# Patient Record
Sex: MALE | Race: WHITE | NOT HISPANIC OR LATINO | Employment: OTHER | URBAN - METROPOLITAN AREA
[De-identification: names, ages, dates, MRNs, and addresses within clinical notes are randomized per-mention and may not be internally consistent; named-entity substitution may affect disease eponyms.]

---

## 2023-06-07 DIAGNOSIS — K21.9 GASTROESOPHAGEAL REFLUX DISEASE, UNSPECIFIED WHETHER ESOPHAGITIS PRESENT: ICD-10-CM

## 2023-06-07 DIAGNOSIS — E78.2 MIXED HYPERLIPIDEMIA: ICD-10-CM

## 2023-06-08 PROBLEM — E78.5 HYPERLIPIDEMIA: Status: ACTIVE | Noted: 2023-06-08

## 2023-06-08 PROBLEM — K21.9 GERD (GASTROESOPHAGEAL REFLUX DISEASE): Status: ACTIVE | Noted: 2023-06-08

## 2023-06-08 PROBLEM — R01.1 CARDIAC MURMUR: Status: ACTIVE | Noted: 2023-06-08

## 2023-06-08 PROBLEM — N52.9 ERECTILE DYSFUNCTION: Status: ACTIVE | Noted: 2023-06-08

## 2023-06-08 RX ORDER — PRAVASTATIN SODIUM 40 MG/1
40 TABLET ORAL DAILY
Qty: 90 TABLET | Refills: 1 | Status: SHIPPED | OUTPATIENT
Start: 2023-06-08 | End: 2023-06-23 | Stop reason: SDUPTHER

## 2023-06-08 RX ORDER — OMEPRAZOLE 40 MG/1
40 CAPSULE, DELAYED RELEASE ORAL DAILY
COMMUNITY
Start: 2023-01-10 | End: 2023-06-08 | Stop reason: SDUPTHER

## 2023-06-08 RX ORDER — OMEPRAZOLE 40 MG/1
40 CAPSULE, DELAYED RELEASE ORAL DAILY
Qty: 90 CAPSULE | Refills: 1 | Status: SHIPPED | OUTPATIENT
Start: 2023-06-08 | End: 2023-06-23 | Stop reason: SDUPTHER

## 2023-06-08 RX ORDER — PRAVASTATIN SODIUM 40 MG/1
40 TABLET ORAL DAILY
COMMUNITY
End: 2023-06-08 | Stop reason: SDUPTHER

## 2023-06-23 DIAGNOSIS — E78.2 MIXED HYPERLIPIDEMIA: ICD-10-CM

## 2023-06-23 DIAGNOSIS — K21.9 GASTROESOPHAGEAL REFLUX DISEASE, UNSPECIFIED WHETHER ESOPHAGITIS PRESENT: ICD-10-CM

## 2023-06-24 RX ORDER — PRAVASTATIN SODIUM 40 MG/1
40 TABLET ORAL DAILY
Qty: 90 TABLET | Refills: 1 | Status: SHIPPED | OUTPATIENT
Start: 2023-06-24 | End: 2023-07-12 | Stop reason: SDUPTHER

## 2023-06-24 RX ORDER — OMEPRAZOLE 40 MG/1
40 CAPSULE, DELAYED RELEASE ORAL DAILY
Qty: 90 CAPSULE | Refills: 1 | Status: SHIPPED | OUTPATIENT
Start: 2023-06-24 | End: 2023-07-12 | Stop reason: SDUPTHER

## 2023-07-10 ENCOUNTER — TELEMEDICINE (OUTPATIENT)
Dept: PRIMARY CARE | Facility: CLINIC | Age: 68
End: 2023-07-10
Payer: MEDICARE

## 2023-07-10 DIAGNOSIS — E34.9 TESTOSTERONE DEFICIENCY: ICD-10-CM

## 2023-07-10 DIAGNOSIS — R01.1 CARDIAC MURMUR: ICD-10-CM

## 2023-07-10 DIAGNOSIS — I25.10 ASHD (ARTERIOSCLEROTIC HEART DISEASE): Primary | ICD-10-CM

## 2023-07-10 DIAGNOSIS — K21.00 GASTROESOPHAGEAL REFLUX DISEASE WITH ESOPHAGITIS WITHOUT HEMORRHAGE: ICD-10-CM

## 2023-07-10 DIAGNOSIS — I51.7 LVH (LEFT VENTRICULAR HYPERTROPHY): ICD-10-CM

## 2023-07-10 PROCEDURE — 99213 OFFICE O/P EST LOW 20 MIN: CPT | Performed by: FAMILY MEDICINE

## 2023-07-10 RX ORDER — SILDENAFIL 100 MG/1
TABLET, FILM COATED ORAL
COMMUNITY

## 2023-07-10 ASSESSMENT — ENCOUNTER SYMPTOMS
SLEEP DISTURBANCE: 0
CHEST TIGHTNESS: 0
PHOTOPHOBIA: 0
DIARRHEA: 0
CONFUSION: 0
MYALGIAS: 0
ABDOMINAL PAIN: 0
RHINORRHEA: 0
SINUS PAIN: 0
ADENOPATHY: 0
STRIDOR: 0
APPETITE CHANGE: 0
COLOR CHANGE: 0
LOSS OF SENSATION IN FEET: 0
BLOOD IN STOOL: 0
HEADACHES: 0
FLANK PAIN: 0
FEVER: 0
CONSTITUTIONAL NEGATIVE: 1
HEMATURIA: 0
DYSPHORIC MOOD: 0
PALPITATIONS: 0
SHORTNESS OF BREATH: 0
POLYDIPSIA: 0
NERVOUS/ANXIOUS: 0
DIZZINESS: 0
SPEECH DIFFICULTY: 0
ABDOMINAL DISTENTION: 0
SORE THROAT: 0
EYE PAIN: 0
ARTHRALGIAS: 0
OCCASIONAL FEELINGS OF UNSTEADINESS: 0
DYSURIA: 0
AGITATION: 0
SINUS PRESSURE: 0
TROUBLE SWALLOWING: 0
FATIGUE: 0
RECTAL PAIN: 0
POLYPHAGIA: 0
SEIZURES: 0
ACTIVITY CHANGE: 0
NECK STIFFNESS: 0
DECREASED CONCENTRATION: 0
CONSTIPATION: 0
COUGH: 0
DEPRESSION: 0

## 2023-07-10 NOTE — PROGRESS NOTES
Subjective   Patient ID: Tom Pyle is a 67 y.o. male who presents for Erectile Dysfunction and Heart Problem.    PHONECALL    Patient presents today to discuss getting some labs and a possible stress test completed. Patient states everything is noted in his message from 06/19/2023.   Patient denies any chest pain, SOB, dizziness, edema or headaches          Review of Systems   Constitutional: Negative.  Negative for activity change, appetite change, fatigue and fever.   HENT:  Negative for congestion, dental problem, ear discharge, ear pain, mouth sores, rhinorrhea, sinus pressure, sinus pain, sore throat, tinnitus and trouble swallowing.    Eyes:  Negative for photophobia, pain and visual disturbance.   Respiratory:  Negative for cough, chest tightness, shortness of breath and stridor.    Cardiovascular:  Negative for chest pain and palpitations.   Gastrointestinal:  Negative for abdominal distention, abdominal pain, blood in stool, constipation, diarrhea and rectal pain.   Endocrine: Negative for cold intolerance, heat intolerance, polydipsia, polyphagia and polyuria.   Genitourinary:  Negative for dysuria, flank pain, hematuria and urgency.   Musculoskeletal:  Negative for arthralgias, gait problem, myalgias and neck stiffness.   Skin:  Negative for color change and rash.   Allergic/Immunologic: Negative for environmental allergies and food allergies.   Neurological:  Negative for dizziness, seizures, syncope, speech difficulty and headaches.   Hematological:  Negative for adenopathy.   Psychiatric/Behavioral:  Negative for agitation, confusion, decreased concentration, dysphoric mood and sleep disturbance. The patient is not nervous/anxious.        Objective   There were no vitals taken for this visit.    Physical Exam  Constitutional: Well developed, well nourished, alert and in no acute distress   Psychiatric: Mood calm and affect normal    Telephone Visit - Audio Communication Only      Assessment/Plan    Problem List Items Addressed This Visit       GERD (gastroesophageal reflux disease)    Cardiac murmur    Relevant Orders    Echocardiogram stress test     Other Visit Diagnoses       ASHD (arteriosclerotic heart disease)    -  Primary    Relevant Medications    sildenafil (Viagra) 100 mg tablet    LVH (left ventricular hypertrophy)        Relevant Medications    sildenafil (Viagra) 100 mg tablet    Other Relevant Orders    Echocardiogram stress test    Testosterone deficiency        Relevant Orders    Testosterone         Scribe Attestation  By signing my name below, I, Joselin MINOR , Scribe   attest that this documentation has been prepared under the direction and in the presence of Chuck Leon DO.  Provider Attestation - Scribe documentation  All medical record entries made by the Scribe were at my direction and personally dictated by me. I have reviewed the chart and agree that the record accurately reflects my personal performance of the history, physical exam, discussion and plan.

## 2023-07-10 NOTE — PATIENT INSTRUCTIONS
Follow up in 3.5 months    Continue current medications and therapy for chronic medical conditions.    Patient was advised importance of proper diet/nutrition in addition adequate hydration. Patient was encouraged moderate exercise program to include 30 minutes daily for 5 days of the week or 150 minutes weekly. Patient will follow-up with us as scheduled.    Testosterone level ordered     Echo /stress test ordered

## 2023-07-12 DIAGNOSIS — E78.2 MIXED HYPERLIPIDEMIA: ICD-10-CM

## 2023-07-12 DIAGNOSIS — K21.9 GASTROESOPHAGEAL REFLUX DISEASE, UNSPECIFIED WHETHER ESOPHAGITIS PRESENT: ICD-10-CM

## 2023-07-12 RX ORDER — OMEPRAZOLE 40 MG/1
40 CAPSULE, DELAYED RELEASE ORAL DAILY
Qty: 90 CAPSULE | Refills: 1 | Status: SHIPPED | OUTPATIENT
Start: 2023-07-12 | End: 2024-06-07 | Stop reason: SDUPTHER

## 2023-07-12 RX ORDER — PRAVASTATIN SODIUM 40 MG/1
40 TABLET ORAL DAILY
Qty: 90 TABLET | Refills: 1 | Status: SHIPPED | OUTPATIENT
Start: 2023-07-12 | End: 2024-05-02 | Stop reason: WASHOUT

## 2023-08-01 ENCOUNTER — LAB (OUTPATIENT)
Dept: LAB | Facility: LAB | Age: 68
End: 2023-08-01
Payer: MEDICARE

## 2023-08-01 DIAGNOSIS — E34.9 TESTOSTERONE DEFICIENCY: ICD-10-CM

## 2023-08-01 DIAGNOSIS — E78.2 MIXED HYPERLIPIDEMIA: ICD-10-CM

## 2023-08-01 LAB
ALANINE AMINOTRANSFERASE (SGPT) (U/L) IN SER/PLAS: 16 U/L (ref 10–52)
ALBUMIN (G/DL) IN SER/PLAS: 4.6 G/DL (ref 3.4–5)
ALKALINE PHOSPHATASE (U/L) IN SER/PLAS: 73 U/L (ref 33–136)
ANION GAP IN SER/PLAS: 11 MMOL/L (ref 10–20)
ASPARTATE AMINOTRANSFERASE (SGOT) (U/L) IN SER/PLAS: 18 U/L (ref 9–39)
BASOPHILS (10*3/UL) IN BLOOD BY AUTOMATED COUNT: 0.02 X10E9/L (ref 0–0.1)
BASOPHILS/100 LEUKOCYTES IN BLOOD BY AUTOMATED COUNT: 0.4 % (ref 0–2)
BILIRUBIN TOTAL (MG/DL) IN SER/PLAS: 0.8 MG/DL (ref 0–1.2)
CALCIDIOL (25 OH VITAMIN D3) (NG/ML) IN SER/PLAS: 44 NG/ML
CALCIUM (MG/DL) IN SER/PLAS: 9.9 MG/DL (ref 8.6–10.3)
CARBON DIOXIDE, TOTAL (MMOL/L) IN SER/PLAS: 28 MMOL/L (ref 21–32)
CHLORIDE (MMOL/L) IN SER/PLAS: 106 MMOL/L (ref 98–107)
CHOLESTEROL (MG/DL) IN SER/PLAS: 225 MG/DL (ref 0–199)
CHOLESTEROL IN HDL (MG/DL) IN SER/PLAS: 59.3 MG/DL
CHOLESTEROL/HDL RATIO: 3.8
CREATININE (MG/DL) IN SER/PLAS: 0.95 MG/DL (ref 0.5–1.3)
EOSINOPHILS (10*3/UL) IN BLOOD BY AUTOMATED COUNT: 0.1 X10E9/L (ref 0–0.7)
EOSINOPHILS/100 LEUKOCYTES IN BLOOD BY AUTOMATED COUNT: 1.8 % (ref 0–6)
ERYTHROCYTE DISTRIBUTION WIDTH (RATIO) BY AUTOMATED COUNT: 12 % (ref 11.5–14.5)
ERYTHROCYTE MEAN CORPUSCULAR HEMOGLOBIN CONCENTRATION (G/DL) BY AUTOMATED: 32.3 G/DL (ref 32–36)
ERYTHROCYTE MEAN CORPUSCULAR VOLUME (FL) BY AUTOMATED COUNT: 96 FL (ref 80–100)
ERYTHROCYTES (10*6/UL) IN BLOOD BY AUTOMATED COUNT: 4.8 X10E12/L (ref 4.5–5.9)
GFR MALE: 88 ML/MIN/1.73M2
GLUCOSE (MG/DL) IN SER/PLAS: 95 MG/DL (ref 74–99)
HEMATOCRIT (%) IN BLOOD BY AUTOMATED COUNT: 46.2 % (ref 41–52)
HEMOGLOBIN (G/DL) IN BLOOD: 14.9 G/DL (ref 13.5–17.5)
IMMATURE GRANULOCYTES/100 LEUKOCYTES IN BLOOD BY AUTOMATED COUNT: 0.2 % (ref 0–0.9)
LDL: 148 MG/DL (ref 0–99)
LEUKOCYTES (10*3/UL) IN BLOOD BY AUTOMATED COUNT: 5.4 X10E9/L (ref 4.4–11.3)
LYMPHOCYTES (10*3/UL) IN BLOOD BY AUTOMATED COUNT: 1.62 X10E9/L (ref 1.2–4.8)
LYMPHOCYTES/100 LEUKOCYTES IN BLOOD BY AUTOMATED COUNT: 29.8 % (ref 13–44)
MONOCYTES (10*3/UL) IN BLOOD BY AUTOMATED COUNT: 0.45 X10E9/L (ref 0.1–1)
MONOCYTES/100 LEUKOCYTES IN BLOOD BY AUTOMATED COUNT: 8.3 % (ref 2–10)
NEUTROPHILS (10*3/UL) IN BLOOD BY AUTOMATED COUNT: 3.24 X10E9/L (ref 1.2–7.7)
NEUTROPHILS/100 LEUKOCYTES IN BLOOD BY AUTOMATED COUNT: 59.5 % (ref 40–80)
PLATELETS (10*3/UL) IN BLOOD AUTOMATED COUNT: 292 X10E9/L (ref 150–450)
POTASSIUM (MMOL/L) IN SER/PLAS: 4.1 MMOL/L (ref 3.5–5.3)
PROTEIN TOTAL: 7.8 G/DL (ref 6.4–8.2)
SODIUM (MMOL/L) IN SER/PLAS: 141 MMOL/L (ref 136–145)
TESTOSTERONE (NG/DL) IN SER/PLAS: 580 NG/DL (ref 240–1000)
THYROTROPIN (MIU/L) IN SER/PLAS BY DETECTION LIMIT <= 0.05 MIU/L: 0.77 MIU/L (ref 0.44–3.98)
TRIGLYCERIDE (MG/DL) IN SER/PLAS: 88 MG/DL (ref 0–149)
UREA NITROGEN (MG/DL) IN SER/PLAS: 18 MG/DL (ref 6–23)
VLDL: 18 MG/DL (ref 0–40)

## 2023-08-01 PROCEDURE — 36415 COLL VENOUS BLD VENIPUNCTURE: CPT

## 2023-08-01 PROCEDURE — 84403 ASSAY OF TOTAL TESTOSTERONE: CPT

## 2023-08-01 NOTE — LETTER
September 9, 2023     Tom Pyle  [address] [address]      Dear Mr. Pyle:    Below are the results from your recent visit:    LAB RESULTS SHOW ELEVATED CHOLESTEROL LEVELS. RECOMMEND LOW CHOLESTEROL DIET TO AVOID RED MEATS, CHEESES AND FRIEND FOODS. MAY NEED TO CONSIDER STARTING A STATIN MEDICATION.     Resulted Orders   Lipid Panel   Result Value Ref Range    Cholesterol 225 (H) 0 - 199 mg/dL      Comment:      .      AGE      DESIRABLE   BORDERLINE HIGH   HIGH     0-19 Y     0 - 169       170 - 199     >/= 200    20-24 Y     0 - 189       190 - 224     >/= 225         >24 Y     0 - 199       200 - 239     >/= 240   **All ranges are based on fasting samples. Specific   therapeutic targets will vary based on patient-specific   cardiac risk.  .   Pediatric guidelines reference:Pediatrics 2011, 128(S5).   Adult guidelines reference: NCEP ATPIII Guidelines,     ANDREW 2001, 258:2486-97  .   Venipuncture immediately after or during the    administration of Metamizole may lead to falsely   low results. Testing should be performed immediately   prior to Metamizole dosing.      HDL 59.3 mg/dL      Comment:      .      AGE      VERY LOW   LOW     NORMAL    HIGH       0-19 Y       < 35   < 40     40-45     ----    20-24 Y       ----   < 40       >45     ----      >24 Y       ----   < 40     40-60      >60  .      Cholesterol/HDL Ratio 3.8       Comment:      REF VALUES  DESIRABLE  < 3.4  HIGH RISK  > 5.0       (H) 0 - 99 mg/dL      Comment:      .                           NEAR      BORD      AGE      DESIRABLE  OPTIMAL    HIGH     HIGH     VERY HIGH     0-19 Y     0 - 109     ---    110-129   >/= 130     ----    20-24 Y     0 - 119     ---    120-159   >/= 160     ----      >24 Y     0 -  99   100-129  130-159   160-189     >/=190  .      VLDL 18 0 - 40 mg/dL    Triglycerides 88 0 - 149 mg/dL      Comment:      .      AGE      DESIRABLE   BORDERLINE HIGH   HIGH     VERY HIGH   0 D-90 D    19 -  174         ----         ----        ----  91 D- 9 Y     0 -  74        75 -  99     >/= 100      ----    10-19 Y     0 -  89        90 - 129     >/= 130      ----    20-24 Y     0 - 114       115 - 149     >/= 150      ----         >24 Y     0 - 149       150 - 199    200- 499    >/= 500  .   Venipuncture immediately after or during the    administration of Metamizole may lead to falsely   low results. Testing should be performed immediately   prior to Metamizole dosing.     CBC and Auto Differential   Result Value Ref Range    WBC 5.4 4.4 - 11.3 x10E9/L    RBC 4.80 4.50 - 5.90 x10E12/L    Hemoglobin 14.9 13.5 - 17.5 g/dL    Hematocrit 46.2 41.0 - 52.0 %    MCV 96 80 - 100 fL    MCHC 32.3 32.0 - 36.0 g/dL    Platelets 292 150 - 450 x10E9/L    RDW 12.0 11.5 - 14.5 %    Neutrophils % 59.5 40.0 - 80.0 %    Immature Granulocytes %, Automated 0.2 0.0 - 0.9 %      Comment:       Immature Granulocyte Count (IG) includes promyelocytes,    myelocytes and metamyelocytes but does not include bands.   Percent differential counts (%) should be interpreted in the   context of the absolute cell counts (cells/L).      Lymphocytes % 29.8 13.0 - 44.0 %    Monocytes % 8.3 2.0 - 10.0 %    Eosinophils % 1.8 0.0 - 6.0 %    Basophils % 0.4 0.0 - 2.0 %    Neutrophils Absolute 3.24 1.20 - 7.70 x10E9/L    Lymphocytes Absolute 1.62 1.20 - 4.80 x10E9/L    Monocytes Absolute 0.45 0.10 - 1.00 x10E9/L    Eosinophils Absolute 0.10 0.00 - 0.70 x10E9/L    Basophils Absolute 0.02 0.00 - 0.10 x10E9/L   Thyroid Stimulating Hormone   Result Value Ref Range    TSH 0.77 0.44 - 3.98 mIU/L      Comment:       TSH testing is performed using different testing    methodology at [facility] than at other    [facility]. Direct result comparisons should    only be made within the same method.     Comprehensive Metabolic Panel   Result Value Ref Range    Glucose 95 74 - 99 mg/dL    Sodium 141 136 - 145 mmol/L    Potassium 4.1 3.5 - 5.3 mmol/L     Chloride 106 98 - 107 mmol/L    Bicarbonate 28 21 - 32 mmol/L    Anion Gap 11 10 - 20 mmol/L    Urea Nitrogen 18 6 - 23 mg/dL    Creatinine 0.95 0.50 - 1.30 mg/dL    GFR MALE 88 >90 mL/min/1.73m2      Comment:       CALCULATIONS OF ESTIMATED GFR ARE PERFORMED   USING THE 2021 CKD-EPI STUDY REFIT EQUATION   WITHOUT THE RACE VARIABLE FOR THE IDMS-TRACEABLE   CREATININE METHODS.    https://jasn.asnjournals.org/content/early/2021/09/22/ASN.1445941646      Calcium 9.9 8.6 - 10.3 mg/dL    Albumin 4.6 3.4 - 5.0 g/dL    Alkaline Phosphatase 73 33 - 136 U/L    Total Protein 7.8 6.4 - 8.2 g/dL    AST 18 9 - 39 U/L    Total Bilirubin 0.8 0.0 - 1.2 mg/dL    ALT (SGPT) 16 10 - 52 U/L      Comment:       Patients treated with Sulfasalazine may generate    falsely decreased results for ALT.     Vitamin D, Total   Result Value Ref Range    Vitamin D, 25-Hydroxy 44 ng/mL      Comment:      .  DEFICIENCY:         < 20   NG/ML  INSUFFICIENCY:      20-29  NG/ML  SUFFICIENCY:         NG/ML    THIS ASSAY ACCURATELY QUANTIFIES THE SUM OF  VITAMIN D3, 25-HYDROXY AND VIT D2,25-HYDROXY.       The test results show that your current treatment is working. Please continue your current medication and plan. We recommend that you repeat the above test(s) in 3 months.    If you have any questions or concerns, please don't hesitate to call.         Sincerely,        Chuck Leon, DO

## 2024-02-22 ENCOUNTER — OFFICE VISIT (OUTPATIENT)
Dept: PRIMARY CARE | Facility: CLINIC | Age: 69
End: 2024-02-22
Payer: MEDICARE

## 2024-02-22 ENCOUNTER — HOSPITAL ENCOUNTER (OUTPATIENT)
Dept: RADIOLOGY | Facility: CLINIC | Age: 69
Discharge: HOME | End: 2024-02-22
Payer: MEDICARE

## 2024-02-22 VITALS
HEIGHT: 70 IN | WEIGHT: 193 LBS | BODY MASS INDEX: 27.63 KG/M2 | TEMPERATURE: 98.4 F | HEART RATE: 76 BPM | DIASTOLIC BLOOD PRESSURE: 68 MMHG | RESPIRATION RATE: 16 BRPM | SYSTOLIC BLOOD PRESSURE: 122 MMHG | OXYGEN SATURATION: 96 %

## 2024-02-22 DIAGNOSIS — J45.30 MILD PERSISTENT REACTIVE AIRWAY DISEASE WITHOUT COMPLICATION (HHS-HCC): ICD-10-CM

## 2024-02-22 DIAGNOSIS — K21.00 GASTROESOPHAGEAL REFLUX DISEASE WITH ESOPHAGITIS WITHOUT HEMORRHAGE: ICD-10-CM

## 2024-02-22 DIAGNOSIS — E78.2 MIXED HYPERLIPIDEMIA: ICD-10-CM

## 2024-02-22 DIAGNOSIS — R06.09 DYSPNEA ON EXERTION: ICD-10-CM

## 2024-02-22 DIAGNOSIS — Z12.5 PROSTATE CANCER SCREENING: ICD-10-CM

## 2024-02-22 DIAGNOSIS — N52.9 ERECTILE DYSFUNCTION, UNSPECIFIED ERECTILE DYSFUNCTION TYPE: ICD-10-CM

## 2024-02-22 DIAGNOSIS — Z00.00 MEDICARE ANNUAL WELLNESS VISIT, SUBSEQUENT: Primary | ICD-10-CM

## 2024-02-22 PROCEDURE — 99497 ADVNCD CARE PLAN 30 MIN: CPT | Performed by: FAMILY MEDICINE

## 2024-02-22 PROCEDURE — G0439 PPPS, SUBSEQ VISIT: HCPCS | Performed by: FAMILY MEDICINE

## 2024-02-22 PROCEDURE — 71046 X-RAY EXAM CHEST 2 VIEWS: CPT

## 2024-02-22 PROCEDURE — 99212 OFFICE O/P EST SF 10 MIN: CPT | Performed by: FAMILY MEDICINE

## 2024-02-22 PROCEDURE — 71046 X-RAY EXAM CHEST 2 VIEWS: CPT | Performed by: RADIOLOGY

## 2024-02-22 RX ORDER — ATORVASTATIN CALCIUM 20 MG/1
20 TABLET, FILM COATED ORAL NIGHTLY
Qty: 90 TABLET | Refills: 0 | Status: SHIPPED | OUTPATIENT
Start: 2024-02-22 | End: 2024-05-20

## 2024-02-22 RX ORDER — DOXYCYCLINE HYCLATE 20 MG
20 TABLET ORAL 2 TIMES DAILY
COMMUNITY
Start: 2023-12-08 | End: 2024-06-11 | Stop reason: ALTCHOICE

## 2024-02-22 RX ORDER — ALBUTEROL SULFATE 90 UG/1
2 AEROSOL, METERED RESPIRATORY (INHALATION) EVERY 6 HOURS PRN
Qty: 8 G | Refills: 1 | Status: SHIPPED | OUTPATIENT
Start: 2024-02-22 | End: 2025-02-21

## 2024-02-22 ASSESSMENT — ENCOUNTER SYMPTOMS
SEIZURES: 0
CONSTITUTIONAL NEGATIVE: 1
RECTAL PAIN: 0
DECREASED CONCENTRATION: 0
APPETITE CHANGE: 0
HEADACHES: 0
COUGH: 0
FEVER: 0
SHORTNESS OF BREATH: 0
SINUS PAIN: 0
SORE THROAT: 0
PHOTOPHOBIA: 0
TROUBLE SWALLOWING: 0
SPEECH DIFFICULTY: 0
ABDOMINAL DISTENTION: 0
DEPRESSION: 0
STRIDOR: 0
DYSPHORIC MOOD: 0
HEMATURIA: 0
COLOR CHANGE: 0
MYALGIAS: 0
NERVOUS/ANXIOUS: 0
ADENOPATHY: 0
CHEST TIGHTNESS: 0
BLOOD IN STOOL: 0
CONFUSION: 0
DIZZINESS: 0
AGITATION: 0
ABDOMINAL PAIN: 0
RHINORRHEA: 0
FATIGUE: 0
CONSTIPATION: 0
NECK STIFFNESS: 0
DYSURIA: 0
SLEEP DISTURBANCE: 0
POLYDIPSIA: 0
ACTIVITY CHANGE: 0
DIARRHEA: 0
OCCASIONAL FEELINGS OF UNSTEADINESS: 0
SINUS PRESSURE: 0
LOSS OF SENSATION IN FEET: 0
EYE PAIN: 0
PALPITATIONS: 0
ARTHRALGIAS: 0
POLYPHAGIA: 0
FLANK PAIN: 0

## 2024-02-22 ASSESSMENT — ACTIVITIES OF DAILY LIVING (ADL)
DOING_HOUSEWORK: INDEPENDENT
TAKING_MEDICATION: INDEPENDENT
MANAGING_FINANCES: INDEPENDENT
DRESSING: INDEPENDENT
GROCERY_SHOPPING: INDEPENDENT
BATHING: INDEPENDENT

## 2024-02-22 ASSESSMENT — PATIENT HEALTH QUESTIONNAIRE - PHQ9
SUM OF ALL RESPONSES TO PHQ9 QUESTIONS 1 AND 2: 0
2. FEELING DOWN, DEPRESSED OR HOPELESS: NOT AT ALL
1. LITTLE INTEREST OR PLEASURE IN DOING THINGS: NOT AT ALL

## 2024-02-22 NOTE — PATIENT INSTRUCTIONS
Follow up in 10 weeks    Continue current medications and therapy for chronic medical conditions.    Patient was advised importance of proper diet/nutrition in addition adequate hydration. Patient was encouraged moderate exercise program to include 30 minutes daily for 5 days of the week or 150 minutes weekly. Patient will follow-up with us as scheduled.    Complete Medicare annual wellness physical/exam     Discontinue pravastatin     Start atorvastatin 20 mg at bedtime    Obtain fasting lipid, CMP, and PSA labs     Start albuterol inhaler 2 puffs every 6 hours as needed    CXR PA/lateral today    PFTs in office    Counseled on advanced directives

## 2024-02-22 NOTE — PROGRESS NOTES
Subjective   Reason for Visit: Kevin Pyle is an 68 y.o. male here for a Medicare Wellness visit.     Past Medical, Surgical, and Family History reviewed and updated in chart.    Reviewed all medications by prescribing practitioner or clinical pharmacist (such as prescriptions, OTCs, herbal therapies and supplements) and documented in the medical record.    Patient is here for medicare annual wellness visit.    Patient states having some shortness of breath during the summer. Patient states just feels it when he is swimming. Patient denies having any other symptoms when the episode of shortness of breath occur. Patient denies have shortness of breath today.    Patient would like a referral for genetic counseling.     Patient denies any other symptoms or concerns today.        Patient Care Team:  Chuck Leon DO as PCP - General  Chuck Leon DO as PCP - [employer] PCP     Review of Systems   Constitutional: Negative.  Negative for activity change, appetite change, fatigue and fever.   HENT:  Negative for congestion, dental problem, ear discharge, ear pain, mouth sores, rhinorrhea, sinus pressure, sinus pain, sore throat, tinnitus and trouble swallowing.    Eyes:  Negative for photophobia, pain and visual disturbance.   Respiratory:  Negative for cough, chest tightness, shortness of breath and stridor.    Cardiovascular:  Negative for chest pain and palpitations.   Gastrointestinal:  Negative for abdominal distention, abdominal pain, blood in stool, constipation, diarrhea and rectal pain.   Endocrine: Negative for cold intolerance, heat intolerance, polydipsia, polyphagia and polyuria.   Genitourinary:  Negative for dysuria, flank pain, hematuria and urgency.   Musculoskeletal:  Negative for arthralgias, gait problem, myalgias and neck stiffness.   Skin:  Negative for color change and rash.   Allergic/Immunologic: Negative for environmental allergies and food allergies.  "  Neurological:  Negative for dizziness, seizures, syncope, speech difficulty and headaches.   Hematological:  Negative for adenopathy.   Psychiatric/Behavioral:  Negative for agitation, confusion, decreased concentration, dysphoric mood and sleep disturbance. The patient is not nervous/anxious.        Objective   Vitals:  /68 (BP Location: Right arm, Patient Position: Sitting, BP Cuff Size: Adult)   Pulse 76   Temp 36.9 °C (98.4 °F)   Resp 16   Ht 1.778 m (5' 10\")   Wt 87.5 kg (193 lb)   SpO2 96%   BMI 27.69 kg/m²       Physical Exam  Vitals reviewed.   Constitutional:       General: He is not in acute distress.     Appearance: Normal appearance. He is normal weight. He is not ill-appearing or diaphoretic.   HENT:      Head: Normocephalic.      Right Ear: Tympanic membrane and external ear normal.      Left Ear: Tympanic membrane and external ear normal.      Nose: Nose normal. No congestion.      Mouth/Throat:      Pharynx: No posterior oropharyngeal erythema.   Eyes:      General:         Right eye: No discharge.         Left eye: No discharge.      Extraocular Movements: Extraocular movements intact.      Conjunctiva/sclera: Conjunctivae normal.      Pupils: Pupils are equal, round, and reactive to light.   Cardiovascular:      Rate and Rhythm: Normal rate and regular rhythm.      Pulses: Normal pulses.      Heart sounds: Normal heart sounds. No murmur heard.  Pulmonary:      Effort: Pulmonary effort is normal. No respiratory distress.      Breath sounds: Normal breath sounds. No wheezing or rales.   Chest:      Chest wall: No tenderness.   Abdominal:      General: Abdomen is flat. Bowel sounds are normal. There is no distension.      Palpations: There is no mass.      Tenderness: There is no abdominal tenderness. There is no guarding.   Musculoskeletal:         General: No tenderness. Normal range of motion.      Cervical back: Normal range of motion and neck supple. No tenderness.      Right lower " leg: No edema.      Left lower leg: No edema.   Skin:     General: Skin is dry.      Coloration: Skin is not jaundiced.      Findings: No bruising, erythema or rash.   Neurological:      General: No focal deficit present.      Mental Status: He is alert and oriented to person, place, and time. Mental status is at baseline.      Cranial Nerves: No cranial nerve deficit.      Sensory: No sensory deficit.      Coordination: Coordination normal.      Gait: Gait normal.   Psychiatric:         Mood and Affect: Mood normal.         Thought Content: Thought content normal.         Judgment: Judgment normal.         Assessment/Plan   Problem List Items Addressed This Visit       Hyperlipidemia    Relevant Medications    atorvastatin (Lipitor) 20 mg tablet    GERD (gastroesophageal reflux disease)    Erectile dysfunction     Other Visit Diagnoses       Medicare annual wellness visit, subsequent    -  Primary    Prostate cancer screening        Relevant Orders    Prostate Spec.Ag,Screen    Dyspnea on exertion        Relevant Medications    albuterol (Ventolin HFA) 90 mcg/actuation inhaler    Other Relevant Orders    XR chest 2 views (Completed)    Mild persistent reactive airway disease without complication              Scribe Attestation  By signing my name below, I, Chuck Leon DO , Scribe   attest that this documentation has been prepared under the direction and in the presence of Chuck Leon DO.    Provider Attestation - Scribe documentation    All medical record entries made by the Scribe were at my direction and personally dictated by me. I have reviewed the chart and agree that the record accurately reflects my personal performance of the history, physical exam, discussion and plan.

## 2024-02-26 ENCOUNTER — PATIENT MESSAGE (OUTPATIENT)
Dept: PRIMARY CARE | Facility: CLINIC | Age: 69
End: 2024-02-26
Payer: MEDICARE

## 2024-02-26 DIAGNOSIS — Z83.2 FAMILY HISTORY OF BLOOD DISORDER: ICD-10-CM

## 2024-02-26 NOTE — TELEPHONE ENCOUNTER
From: Kevin Pyle  To: Chuck Leon DO  Sent: 2/26/2024 1:16 PM EST  Subject: MDS    I have contacted Genetics Counseling Dept and they require a PCP referral. Both my brothers have been dx with it.

## 2024-04-29 ASSESSMENT — ENCOUNTER SYMPTOMS
SORE THROAT: 0
CLAUDICATION: 0
SWOLLEN GLANDS: 0
FEVER: 0
ORTHOPNEA: 0
LEG PAIN: 0
WHEEZING: 1
VOMITING: 0
SYNCOPE: 0
HEMOPTYSIS: 0
NECK PAIN: 0
SHORTNESS OF BREATH: 1
ABDOMINAL PAIN: 0
RHINORRHEA: 0
PND: 0
HEADACHES: 0
SPUTUM PRODUCTION: 0

## 2024-05-02 ENCOUNTER — OFFICE VISIT (OUTPATIENT)
Dept: PRIMARY CARE | Facility: CLINIC | Age: 69
End: 2024-05-02
Payer: MEDICARE

## 2024-05-02 VITALS
WEIGHT: 186 LBS | DIASTOLIC BLOOD PRESSURE: 60 MMHG | HEART RATE: 89 BPM | HEIGHT: 70 IN | TEMPERATURE: 97.8 F | OXYGEN SATURATION: 95 % | BODY MASS INDEX: 26.63 KG/M2 | SYSTOLIC BLOOD PRESSURE: 98 MMHG

## 2024-05-02 DIAGNOSIS — R06.09 DYSPNEA ON EXERTION: Primary | ICD-10-CM

## 2024-05-02 DIAGNOSIS — E78.2 MIXED HYPERLIPIDEMIA: ICD-10-CM

## 2024-05-02 DIAGNOSIS — J41.0 SIMPLE CHRONIC BRONCHITIS (MULTI): ICD-10-CM

## 2024-05-02 DIAGNOSIS — R01.1 CARDIAC MURMUR: ICD-10-CM

## 2024-05-02 DIAGNOSIS — D50.8 IRON DEFICIENCY ANEMIA SECONDARY TO INADEQUATE DIETARY IRON INTAKE: ICD-10-CM

## 2024-05-02 DIAGNOSIS — Z12.5 PROSTATE CANCER SCREENING: ICD-10-CM

## 2024-05-02 PROCEDURE — 99214 OFFICE O/P EST MOD 30 MIN: CPT | Performed by: FAMILY MEDICINE

## 2024-05-02 PROCEDURE — 93000 ELECTROCARDIOGRAM COMPLETE: CPT | Performed by: FAMILY MEDICINE

## 2024-05-02 ASSESSMENT — ENCOUNTER SYMPTOMS
ABDOMINAL DISTENTION: 0
SHORTNESS OF BREATH: 1
POLYPHAGIA: 0
FEVER: 0
SORE THROAT: 0
ACTIVITY CHANGE: 0
SINUS PAIN: 0
SPEECH DIFFICULTY: 0
FATIGUE: 0
NECK STIFFNESS: 0
ARTHRALGIAS: 0
FLANK PAIN: 0
APPETITE CHANGE: 0
CLAUDICATION: 0
CONSTITUTIONAL NEGATIVE: 1
HEADACHES: 0
CONSTIPATION: 0
AGITATION: 0
CHEST TIGHTNESS: 0
EYE PAIN: 0
SPUTUM PRODUCTION: 0
SWOLLEN GLANDS: 0
DIARRHEA: 0
ABDOMINAL PAIN: 0
MYALGIAS: 0
LEG PAIN: 0
HEMOPTYSIS: 0
RHINORRHEA: 0
SLEEP DISTURBANCE: 0
HEMATURIA: 0
SYNCOPE: 0
PND: 0
RECTAL PAIN: 0
STRIDOR: 0
DYSURIA: 0
WHEEZING: 1
ADENOPATHY: 0
DIZZINESS: 0
VOMITING: 0
CONFUSION: 0
SEIZURES: 0
NECK PAIN: 0
BLOOD IN STOOL: 0
POLYDIPSIA: 0
PHOTOPHOBIA: 0
DECREASED CONCENTRATION: 0
NERVOUS/ANXIOUS: 0
ORTHOPNEA: 0
COLOR CHANGE: 0
DYSPHORIC MOOD: 0
COUGH: 0
PALPITATIONS: 0
TROUBLE SWALLOWING: 0
SINUS PRESSURE: 0

## 2024-05-02 NOTE — PROGRESS NOTES
Subjective   Patient ID: Tom Pyle is a 68 y.o. male who presents for Shortness of Breath.    Patient presents today to follow up on shortness of breath with exertion. Patient was prescribed an albuterol inhaler. Patient states he has not noticed any change in the shortness of breath.     Patient was started on Atorvastatin at last visit. Denies any side effects to medication.     Shortness of Breath  This is a chronic problem. The current episode started more than 1 month ago. The problem occurs intermittently. The problem has been unchanged. Associated symptoms include wheezing. Pertinent negatives include no abdominal pain, chest pain, claudication, coryza, ear pain, fever, headaches, hemoptysis, leg pain, leg swelling, neck pain, orthopnea, PND, rash, rhinorrhea, sore throat, sputum production, swollen glands, syncope or vomiting. The symptoms are aggravated by exercise.        Review of Systems   Constitutional: Negative.  Negative for activity change, appetite change, fatigue and fever.   HENT:  Negative for congestion, dental problem, ear discharge, ear pain, mouth sores, rhinorrhea, sinus pressure, sinus pain, sore throat, tinnitus and trouble swallowing.    Eyes:  Negative for photophobia, pain and visual disturbance.   Respiratory:  Positive for shortness of breath and wheezing. Negative for cough, hemoptysis, sputum production, chest tightness and stridor.    Cardiovascular:  Negative for chest pain, palpitations, orthopnea, claudication, leg swelling, syncope and PND.   Gastrointestinal:  Negative for abdominal distention, abdominal pain, blood in stool, constipation, diarrhea, rectal pain and vomiting.   Endocrine: Negative for cold intolerance, heat intolerance, polydipsia, polyphagia and polyuria.   Genitourinary:  Negative for dysuria, flank pain, hematuria and urgency.   Musculoskeletal:  Negative for arthralgias, gait problem, myalgias, neck pain and neck stiffness.   Skin:  Negative for  "color change and rash.   Allergic/Immunologic: Negative for environmental allergies and food allergies.   Neurological:  Negative for dizziness, seizures, syncope, speech difficulty and headaches.   Hematological:  Negative for adenopathy.   Psychiatric/Behavioral:  Negative for agitation, confusion, decreased concentration, dysphoric mood and sleep disturbance. The patient is not nervous/anxious.        Objective   BP 98/60 (BP Location: Right arm, Patient Position: Sitting, BP Cuff Size: Adult)   Pulse 89   Temp 36.6 °C (97.8 °F)   Ht 1.778 m (5' 10\")   Wt 84.4 kg (186 lb)   SpO2 95%   BMI 26.69 kg/m²     Physical Exam  Vitals reviewed.   Constitutional:       General: He is not in acute distress.     Appearance: Normal appearance. He is normal weight. He is not ill-appearing or diaphoretic.   HENT:      Head: Normocephalic.      Right Ear: Tympanic membrane and external ear normal.      Left Ear: Tympanic membrane and external ear normal.      Nose: Nose normal. No congestion.      Mouth/Throat:      Pharynx: No posterior oropharyngeal erythema.   Eyes:      General:         Right eye: No discharge.         Left eye: No discharge.      Extraocular Movements: Extraocular movements intact.      Conjunctiva/sclera: Conjunctivae normal.      Pupils: Pupils are equal, round, and reactive to light.   Cardiovascular:      Rate and Rhythm: Normal rate and regular rhythm.      Pulses: Normal pulses.      Heart sounds: Normal heart sounds. No murmur heard.  Pulmonary:      Effort: Pulmonary effort is normal. No respiratory distress.      Breath sounds: Normal breath sounds. No wheezing or rales.   Chest:      Chest wall: No tenderness.   Abdominal:      General: Abdomen is flat. Bowel sounds are normal. There is no distension.      Palpations: There is no mass.      Tenderness: There is no abdominal tenderness. There is no guarding.   Musculoskeletal:         General: Tenderness present. Normal range of motion.      " Cervical back: Normal range of motion and neck supple. No tenderness.      Right lower leg: No edema.      Left lower leg: No edema.      Comments: Marked rotoscoliosis of the thoracic vertebrae with compromise of lung expansion particularly left-sided   Skin:     General: Skin is dry.      Coloration: Skin is not jaundiced.      Findings: No bruising, erythema or rash.   Neurological:      General: No focal deficit present.      Mental Status: He is alert and oriented to person, place, and time. Mental status is at baseline.      Cranial Nerves: No cranial nerve deficit.      Sensory: No sensory deficit.      Coordination: Coordination normal.      Gait: Gait normal.   Psychiatric:         Mood and Affect: Mood normal.         Thought Content: Thought content normal.         Judgment: Judgment normal.         Assessment/Plan   Problem List Items Addressed This Visit             ICD-10-CM    Hyperlipidemia E78.5    Relevant Orders    Comprehensive Metabolic Panel    Lipid Panel    Cardiac murmur R01.1    Relevant Orders    ECG 12 lead (Clinic Performed) (Completed)    Thyroid Stimulating Hormone     Other Visit Diagnoses         Codes    Dyspnea on exertion    -  Primary R06.09    Relevant Orders    Referral to Pulmonology    Thyroid Stimulating Hormone    Simple chronic bronchitis (Multi)     J41.0    Relevant Orders    Referral to Pulmonology    Prostate cancer screening     Z12.5    Relevant Orders    Prostate Specific Antigen, Screen    Iron deficiency anemia secondary to inadequate dietary iron intake     D50.8    Relevant Orders    CBC          Scribe Attestation  By signing my name below, I, Chuck Leon DO , Veronica   attest that this documentation has been prepared under the direction and in the presence of Chuck Leon DO.    Provider Attestation - Scribe documentation    All medical record entries made by the Scribe were at my direction and personally dictated by me. I have reviewed the chart  and agree that the record accurately reflects my personal performance of the history, physical exam, discussion and plan.

## 2024-05-20 ENCOUNTER — LAB (OUTPATIENT)
Dept: LAB | Facility: LAB | Age: 69
End: 2024-05-20
Payer: MEDICARE

## 2024-05-20 DIAGNOSIS — E78.2 MIXED HYPERLIPIDEMIA: ICD-10-CM

## 2024-05-20 DIAGNOSIS — D50.8 IRON DEFICIENCY ANEMIA SECONDARY TO INADEQUATE DIETARY IRON INTAKE: ICD-10-CM

## 2024-05-20 DIAGNOSIS — R01.1 CARDIAC MURMUR: ICD-10-CM

## 2024-05-20 DIAGNOSIS — Z12.5 PROSTATE CANCER SCREENING: ICD-10-CM

## 2024-05-20 DIAGNOSIS — R06.09 DYSPNEA ON EXERTION: ICD-10-CM

## 2024-05-20 LAB
ALBUMIN SERPL BCP-MCNC: 4.5 G/DL (ref 3.4–5)
ALP SERPL-CCNC: 79 U/L (ref 33–136)
ALT SERPL W P-5'-P-CCNC: 21 U/L (ref 10–52)
ANION GAP SERPL CALC-SCNC: 14 MMOL/L (ref 10–20)
AST SERPL W P-5'-P-CCNC: 19 U/L (ref 9–39)
BILIRUB SERPL-MCNC: 0.7 MG/DL (ref 0–1.2)
BUN SERPL-MCNC: 19 MG/DL (ref 6–23)
CALCIUM SERPL-MCNC: 9.5 MG/DL (ref 8.6–10.3)
CHLORIDE SERPL-SCNC: 104 MMOL/L (ref 98–107)
CHOLEST SERPL-MCNC: 206 MG/DL (ref 0–199)
CHOLESTEROL/HDL RATIO: 3.5
CO2 SERPL-SCNC: 26 MMOL/L (ref 21–32)
CREAT SERPL-MCNC: 0.98 MG/DL (ref 0.5–1.3)
EGFRCR SERPLBLD CKD-EPI 2021: 85 ML/MIN/1.73M*2
ERYTHROCYTE [DISTWIDTH] IN BLOOD BY AUTOMATED COUNT: 12.3 % (ref 11.5–14.5)
GLUCOSE SERPL-MCNC: 95 MG/DL (ref 74–99)
HCT VFR BLD AUTO: 44 % (ref 41–52)
HDLC SERPL-MCNC: 59 MG/DL
HGB BLD-MCNC: 14.2 G/DL (ref 13.5–17.5)
LDLC SERPL CALC-MCNC: 113 MG/DL
MCH RBC QN AUTO: 30.9 PG (ref 26–34)
MCHC RBC AUTO-ENTMCNC: 32.3 G/DL (ref 32–36)
MCV RBC AUTO: 96 FL (ref 80–100)
NON HDL CHOLESTEROL: 147 MG/DL (ref 0–149)
NRBC BLD-RTO: 0 /100 WBCS (ref 0–0)
PLATELET # BLD AUTO: 301 X10*3/UL (ref 150–450)
POTASSIUM SERPL-SCNC: 4.2 MMOL/L (ref 3.5–5.3)
PROT SERPL-MCNC: 7.4 G/DL (ref 6.4–8.2)
PSA SERPL-MCNC: 0.63 NG/ML
RBC # BLD AUTO: 4.59 X10*6/UL (ref 4.5–5.9)
SODIUM SERPL-SCNC: 140 MMOL/L (ref 136–145)
TRIGL SERPL-MCNC: 172 MG/DL (ref 0–149)
TSH SERPL-ACNC: 0.46 MIU/L (ref 0.44–3.98)
VLDL: 34 MG/DL (ref 0–40)
WBC # BLD AUTO: 5.6 X10*3/UL (ref 4.4–11.3)

## 2024-05-20 PROCEDURE — G0103 PSA SCREENING: HCPCS

## 2024-05-20 PROCEDURE — 80061 LIPID PANEL: CPT

## 2024-05-20 PROCEDURE — 84443 ASSAY THYROID STIM HORMONE: CPT

## 2024-05-20 PROCEDURE — 80053 COMPREHEN METABOLIC PANEL: CPT

## 2024-05-20 PROCEDURE — 36415 COLL VENOUS BLD VENIPUNCTURE: CPT

## 2024-05-20 PROCEDURE — 85027 COMPLETE CBC AUTOMATED: CPT

## 2024-05-20 RX ORDER — ATORVASTATIN CALCIUM 20 MG/1
TABLET, FILM COATED ORAL
Qty: 90 TABLET | Refills: 0 | Status: SHIPPED | OUTPATIENT
Start: 2024-05-20 | End: 2024-05-23

## 2024-05-23 RX ORDER — ATORVASTATIN CALCIUM 20 MG/1
TABLET, FILM COATED ORAL
Qty: 90 TABLET | Refills: 0 | Status: SHIPPED | OUTPATIENT
Start: 2024-05-23

## 2024-06-04 NOTE — PROGRESS NOTES
" Patient: Kevin Pyle    [id number]  : 1955 -- AGE 68 y.o.    Provider: Debbie SKELTON [facility]     Location [facility]   Service Date: 24            [facility]  New Visit Note      HISTORY OF PRESENT ILLNESS     The patient's referring provider is: Chuck Leon DO    HISTORY OF PRESENT ILLNESS   Kevin Pyle \"Art\" is a 68 y.o. male with a history of advanced scoliosis, GERD, hyperlidemia, CHANA,  who is a never smoker , who presents to a [facility] for an initial evaluation for dyspnea on exertion and wheezing.     I have independently interviewed and examined the patient in the office and reviewed available records.    Current History    On today's visit, the patient reports he reports he starting wheezing last summer while he was swimming and notices the wheezing and dyspnea when he over exerts himself. He was given albuterol to try with no improvement. He did Spirometry testing in PCPs office that shows poor quality/unable to interpret.   He has a rare dry cough.  He takes omeprazole for GERD with good control.  Denies chest tightness, allergies, night time symptoms and and ER visits for breathing issues.    Previous pulmonary history: He has no history of recurrent infections, or lung disease as a child.  He had no previous lung hx, never on oxygen or inhaler therapy.     Inhalers/nebulized medications: albuterol    Hospitalization History: He has not been hospitalized over the last year for breathing related problem.    Sleep history: Snoring, Denies apnea, feeling tired during the day or taking naps during the day.      ALLERGIES AND MEDICATIONS     ALLERGIES  No Known Allergies    MEDICATIONS  Current Outpatient Medications   Medication Sig Dispense Refill    albuterol (Ventolin HFA) 90 mcg/actuation inhaler Inhale 2 puffs every 6 hours if needed for wheezing or shortness of breath. 8 g 1    " atorvastatin (Lipitor) 20 mg tablet TAKE 1 TABLET(20 MG) BY MOUTH DAILY AT BEDTIME 90 tablet 0    doxycycline (Periostat) 20 mg tablet Take 1 tablet (20 mg) by mouth 2 times a day.      omeprazole (PriLOSEC) 40 mg DR capsule Take 1 capsule (40 mg) by mouth once daily. 90 capsule 1    sildenafil (Viagra) 100 mg tablet take 1 tablet by mouth 1 hour prior to intercourse       No current facility-administered medications for this visit.         PAST HISTORY     PAST MEDICAL HISTORY  GERD  Hyperlipidemia  Iron deficiency anemia  Advanced scoliosis    PAST SURGICAL HISTORY  Past Surgical History:   Procedure Laterality Date    BACK SURGERY      HERNIA REPAIR      OTHER SURGICAL HISTORY  08/30/2021    Hernia repair    OTHER SURGICAL HISTORY  08/30/2021    Back surgery    OTHER SURGICAL HISTORY  08/30/2021    Femur fracture repair    VASECTOMY      WISDOM TOOTH EXTRACTION         IMMUNIZATION HISTORY  Immunization History   Administered Date(s) Administered    Flu vaccine (IIV4), preservative free *Check age/dose* 12/20/2016, 10/15/2021    Flu vaccine, quadrivalent, high-dose, preservative free, age 65y+ (FLUZONE) 12/05/2022, 09/28/2023    Moderna COVID-19 vaccine, bivalent, blue cap/gray label *Check age/dose* 10/04/2022    Moderna SARS-CoV-2 Vaccination 01/11/2021, 02/08/2021, 11/01/2021, 04/11/2022    Pfizer COVID-19 vaccine, Fall 2023, 12 years and older, (30mcg/0.3mL) 09/28/2023    RESPIRATORY SYNCYTIAL VIRUS (RSV), ELIGIBLE PREGNANT PTS, 0.5 ML (ABRYSVO) 09/28/2023    Zoster vaccine, recombinant, adult (SHINGRIX) 11/04/2020, 04/19/2021       SOCIAL HISTORY  Tobacco Smoking: never  Illicit drugs: remote marijuana - now uses edibles  Alcohol consumption: socially  Pets: cat    OCCUPATIONAL/ENVIRONMENTAL HISTORY  Occupation: Retired. Clinical .  No known exposure to asbestos, silica, beryllium or inhaled metals.  No exposure to birds or exotic animals.    FAMILY HISTORY  Family History   Problem Relation  Name Age of Onset    Cancer Mother Mother     Breast cancer Mother Mother     Cancer Father Father     Cancer Brother Jorge     Cancer Brother Isaias      No family history of pulmonary disease.  2 Brothers- MDS  Fartewr  multiple myeloma  No family history of autoimmune disorders.    REVIEW OF SYSTEMS     REVIEW OF SYSTEMS  Review of Systems    Constitutional: No fever, no chills, no night sweats.    Eyes: No double vision, no floaters, no dry eyes.   ENT: See HPI.   Neck: No neck stiffness.  Cardiovascular: No sharp chest pain, no heart racing, no leg swelling.  Respiratory: as noted in HPI.   Gastrointestinal: No nausea, no vomiting, no diarrhea.   Musculoskeletal: No joint pain, no back pain.   Integumentary: No rashes or sores.  Neurological: No dizziness, no headaches. Sleeping well.  Psychiatric: No mood changes.   Endocrine: No hot flashes, no cold intolerance, weight is stable.  Hematologic: No easy bruising or bleeding.    PHYSICAL EXAM     VITAL SIGNS:   Vitals:    06/12/24 0829   BP: 114/85   Pulse: 64   Temp: 36.5 °C (97.7 °F)   SpO2: 94%          CURRENT WEIGHT: Body mass index is 29.8 kg/m².    PREVIOUS WEIGHTS:  Wt Readings from Last 3 Encounters:   05/02/24 84.4 kg (186 lb)   02/22/24 87.5 kg (193 lb)   02/22/23 84.8 kg (187 lb)       Physical Exam    Constitutional: General appearance: Alert and oriented.  No acute distress. Well developed, well nourished.  Head and face: Symmetric  ENT: external inspection of ear and nose normal. No intranasal polyps. No oropharyngeal exudates.    Oropharynx: normal   Neck: supple, no lymphadenopathy  Pulmonary: Chest is normal. No increased work of breathing or signs of respiratory distress. Clear to auscultation bilaterally - no crackles, wheezing, or rhonchi.   Cardiovascular: Heart rate and rhythm normal. Normal S1, S2 - no murmurs, gallops, or pericardial rub.   Abdomen: Soft, non tender, +BS  Extremities: No edema. No clubbing or cyanosis of the fingernails.   "  Neurologic: Moves all four extremities   MSK: Normal movements of extremities. Gait normal   Psychiatric: Intact judgement and insight.    RESULTS/DATA     Pulmonary Function Test Results          Pulmonary Function Test - Scan on 2/26/2024                    Chest Radiograph     XR chest 2 views 02/22/2024    Narrative  Interpreted By:  Venice Rosales,  STUDY:  Chest, 2 views.    INDICATION:  Signs/Symptoms:shortness of breath.    COMPARISON:  None.    ACCESSION NUMBER(S):  [id number]    ORDERING CLINICIAN:  GIORGIO FREEMAN    FINDINGS:  The cardiomediastinal silhouette size is within normal limits.    There is no focal consolidation, edema or pneumothorax.  No sizeable pleural effusion.    Advanced S shaped thoracolumbar scoliosis noted with dextroscoliosis  centered in the mid thoracic region and levoscoliosis centered in the  mid lumbar region.    Impression  1. No acute cardiopulmonary process.  2. Advanced S shaped thoracolumbar scoliosis.    MACRO:  None.    Signed by: Venice Rosales 2/23/2024 6:15 PM  Dictation workstation:   [id number]      Chest CT Scan     No testing done      Echocardiogram     No testing done        Labwork   Complete Blood Count  Lab Results   Component Value Date    WBC 5.6 05/20/2024    HGB 14.2 05/20/2024    HCT 44.0 05/20/2024    MCV 96 05/20/2024     05/20/2024       Peripheral Eosinophil Count/Percentage:   Eosinophils Absolute (x10E9/L)   Date Value   08/01/2023 0.10     Eosinophils % (%)   Date Value   08/01/2023 1.8       Serum Immunoglobulin E:    No results found for: \"IGE\"     ASSESSMENT/PLAN   Mr. yPle is a 68 y.o. male,  with a history of advanced scoliosis, GERD, hyperlidemia, CHANA,  who is a never smoker , who presents to a [facility] for an initial evaluation for dyspnea on exertion and wheezing.     PFT 2/2024: poor quality  CXR 2/2024: No acute cardiopulmonary process. Advanced S shaped thoracolumbar " scoliosis.  EKG 5/2/2024: normal    Problem List and Orders  Problem List Items Addressed This Visit    None  Visit Diagnoses       Wheezing    -  Primary    Relevant Medications    fluticasone propion-salmeteroL (Advair Diskus) 100-50 mcg/dose diskus inhaler    Other Relevant Orders    Methacholine Challenge (Bronchial Provocation)    Complete Pulmonary Function Test Pre/Post Bronchodialator (Spirometry Pre/Post/DLCO/Lung Volumes)    Exhaled Nitric Oxide (FeNO)    Dyspnea on exertion        Relevant Medications    fluticasone propion-salmeteroL (Advair Diskus) 100-50 mcg/dose diskus inhaler    Other Relevant Orders    Methacholine Challenge (Bronchial Provocation)    Complete Pulmonary Function Test Pre/Post Bronchodialator (Spirometry Pre/Post/DLCO/Lung Volumes)    Exhaled Nitric Oxide (FeNO)    Simple chronic bronchitis (Multi)                Assessment and Plan / Recommendations:  Problem List Items Addressed This Visit    None     Dyspnea/Wheezing  - Symbicort 80mcg 2 puff BID - rinse mouth after each use  - will get PFT/FENO/MCCT    Follow up in 5 weeks (after PFTS) or sooner if needed.    If you have any questions please call the office [phone]    Thank you for visiting the Pulmonary clinic today!   Debbie Javier CNP  [phone]

## 2024-06-07 DIAGNOSIS — K21.9 GASTROESOPHAGEAL REFLUX DISEASE, UNSPECIFIED WHETHER ESOPHAGITIS PRESENT: ICD-10-CM

## 2024-06-07 RX ORDER — OMEPRAZOLE 40 MG/1
40 CAPSULE, DELAYED RELEASE ORAL DAILY
Qty: 90 CAPSULE | Refills: 1 | Status: SHIPPED | OUTPATIENT
Start: 2024-06-07

## 2024-06-09 ASSESSMENT — ENCOUNTER SYMPTOMS
PND: 0
SWOLLEN GLANDS: 0
NECK PAIN: 0
LEG PAIN: 0
HEADACHES: 0
SHORTNESS OF BREATH: 1
SYNCOPE: 0
RHINORRHEA: 0
WHEEZING: 1
ABDOMINAL PAIN: 0
SORE THROAT: 0
SPUTUM PRODUCTION: 0
FEVER: 0
HEMOPTYSIS: 0
CLAUDICATION: 0
VOMITING: 0
ORTHOPNEA: 0

## 2024-06-11 ENCOUNTER — LAB (OUTPATIENT)
Dept: LAB | Facility: LAB | Age: 69
End: 2024-06-11
Payer: MEDICARE

## 2024-06-11 ENCOUNTER — OFFICE VISIT (OUTPATIENT)
Dept: GENETICS | Facility: CLINIC | Age: 69
End: 2024-06-11
Payer: MEDICARE

## 2024-06-11 VITALS
HEART RATE: 82 BPM | RESPIRATION RATE: 18 BRPM | BODY MASS INDEX: 29.25 KG/M2 | DIASTOLIC BLOOD PRESSURE: 80 MMHG | SYSTOLIC BLOOD PRESSURE: 134 MMHG | HEIGHT: 68 IN | TEMPERATURE: 97.7 F | WEIGHT: 193 LBS

## 2024-06-11 DIAGNOSIS — Z84.81 FAMILY HISTORY OF BRCA2 GENE POSITIVE: Primary | ICD-10-CM

## 2024-06-11 DIAGNOSIS — Z84.81 FAMILY HISTORY OF BRCA2 GENE POSITIVE: ICD-10-CM

## 2024-06-11 DIAGNOSIS — Z83.2 FAMILY HISTORY OF BLOOD DISORDER: ICD-10-CM

## 2024-06-11 PROCEDURE — 1159F MED LIST DOCD IN RCRD: CPT | Performed by: INTERNAL MEDICINE

## 2024-06-11 PROCEDURE — 99205 OFFICE O/P NEW HI 60 MIN: CPT | Performed by: INTERNAL MEDICINE

## 2024-06-11 PROCEDURE — 1036F TOBACCO NON-USER: CPT | Performed by: INTERNAL MEDICINE

## 2024-06-11 ASSESSMENT — PATIENT HEALTH QUESTIONNAIRE - PHQ9
2. FEELING DOWN, DEPRESSED OR HOPELESS: NOT AT ALL
SUM OF ALL RESPONSES TO PHQ9 QUESTIONS 1 AND 2: 0
1. LITTLE INTEREST OR PLEASURE IN DOING THINGS: NOT AT ALL

## 2024-06-11 ASSESSMENT — ENCOUNTER SYMPTOMS
OCCASIONAL FEELINGS OF UNSTEADINESS: 0
DEPRESSION: 0
LOSS OF SENSATION IN FEET: 0

## 2024-06-11 NOTE — LETTER
06/11/24    Chuck Leon DO  [address]  [place] [address]      Dear Dr. Chuck Leon DO,    Thank you for referring your patient, Tom Pyle, to receive care in my office. I have enclosed a summary of the care provided to Art on 06/11/24.    Please contact me with any questions you may have regarding the visit.    Sincerely,         Alma Gamez MD  [address]  [place] [address]    CC: No Recipients

## 2024-06-11 NOTE — PROGRESS NOTES
MEDICAL GENETICS INITIAL VISIT NOTE    Patient full name: Kevin Pyle  MRN: [id number]  YOB: 1955  Present during this visit: The patient     Primary care/referring provider: Chuck Leon DO    Medical history was obtained from the patient. Prior to this appointment, I reviewed medical records from outpatient medical records and scanned documents, if available.     History of present illness:    Dear KOMAL Leon:    It was a pleasure to see Mr. Pyle in clinic today. Mr. Pyle is a 68 y.o. male who was referred to the Medical Genetics Clinic at [facility] for evaluation of hereditary predisposition to cancer. He was diagnosed with melanoma at the L upper arm 3-4 months ago, s/p surgery. He is here because both brother were diagnosed with MDS, one of whom was found to have a pathogenic variant in BRCA2.     PMH includes GERD and dyslipidemia. He denies premature gray hair, liver disease, lung disease. To see Pulmonology soon due to dyspnea.     Cancer screening history:  Colonoscopy: Yes   x1, no polyps  Precancerous polyps: No   Upper endoscopy: Yes   in 1994, no concerns.   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     Family history:  A five-generation family tree was obtained and scanned to chart.  Pertinent history     Wife (d) breast ca 67    37-yo daughter, no cancer.   Brother (Roxana, Isaias) diagnosed with MDS at 70s s/p BM transplantation. Germline genetic testing identified a pathogenic variant in BRCA2 (see Results).   Brother (tiana Patel) MDS at 70s. No AML.   Mom (d) bilateral breast cancer, first dx 50s.   Mat first cousin has prostate cancer, no mets.   Dad (d) multiple myeloma.  Pat uncle lymphoma  First cousin on pat side, lymphoma    Faroese  Ashkenazi Buddhism: Both sides    Physical examination:  Limited physical exam  No birth marks or skin findings in short telomere syndrome  No oral leukoplakia  Nails,  fingers, toes, hands appear normal.   No craniofacial dysmorphic features .    Results:    Germline genetic testing in brother:  Heme Germline MSK IMPACT -- positive. Pathogenic variant detected in BRCA2.  c.5946delT (p.[id number]*22)    Assessment:  Mr. Pyle is a 68 y.o. male with personal history of melanoma and family history of MDS in brothers, bilateral breast cancer in mother, prostate cancer in maternal first cousin, multiple myeloma in dad, and lymphomas in paternal relatives.     I reviewed genetics of MDS. The majority of individuals with MDS do not have an identifiable genetic mutation. His brother, Isaias, has undergone genetic testing to evaluate genes known to predispose to MDS which came back with the pathogenic variant in BRCA2. At present, there is no strong evidence that BRCA2 mutations predispose to MDS. Due to this, there is no genetic testing indication for MDS genes in Mr. Pyle, since the affected brother was tested negative for the MDS genes. The risk of him developing MDS is increased, given that two family members are affected. A study (PMID [id number]) has shown that having first-degree relatives with MDS increases a person's risk of developing MDS by seven times. There is no screening recommendation for first-degree relatives in this scenario to my knowledge. He is getting CBC regularly with PCP, which I agree.     His brother was found to have a pathogenic variant in BRCA2, c.5946delT (p.[id number]*22). This variant is common in Ashkenazi Judaism population. We reviewed cancer risk associated with BRCA2 mutations, including breast cancer, ovarian cancer, male breast cancer, prostate cancer, pancreatic cancer, and melanoma. We briefly reviewed their management. Mr. Pyle has a 50% chance of inheriting this BRCA2 variant from his brother. I recommend genetic testing of this BRCA2 mutation; this is recommended according to the NCCN guidelines.     We could obtain genetic  testing as a gene panel, in addition to testing only the familial BRCA2 variant. This is not unreasonable in case there is another mutation that was not tested and/or not found in his brother. Mr. Pyle is interested in getting a gene panel, acknowledging increased chance of uncertain findings.     We discussed Genetic Information Non-discrimination Act (INOCENCIA), which is a federal law that inhibits employer and health insurance to make decision based on genetic information. There is also another layer of protection from state law. It however does not apply to life insurance, nursing home insurance, and disability insurance. Having a genetic variant that predisposes to cancers in an asymptomatic individual may have insurability implications.     Benefits of having genetic test include 1) to establish a molecular diagnosis; 2) to provide further medical recommendations specific to each diagnosis; 3) for familial cascade testing, recurrence risk estimation, and family planning; and 4) to allow for participation in support group organizations and enrolment in clinical trials, if any.  Pretest genetic counseling was provided, including the nature of the test; three types of test result (positive, negative, and uncertain); the fact that a negative result does not exclude a possibility of genetic disorders; retention of de-identified genetic data at the testing company; Genetic Information Non-Discrimination Act (INOCENCIA). The patient provided a verbal informed consent.     Plan:  - [facility] Multi-Gene panel, DNA/RNA sequencing (70 genes). TAT 4 weeks. Sample: blood obtained today. Insurance billing.  - Return to clinic when results are available.    Thank you for allowing me to participate in the care of this patient. Please do not hesitate to contact me if you have any questions.     Sincerely,     Alma Gamez MD    Medical Geneticist  [facility]  Phone: [phone]  Fax: [phone]   Address:  [address] [place], [place] [address]  Time spent (this information is required by insurance): face-to-face 45min (12.45pm-1.30pm); preparation 5min; documentation 20min; placing order(s) for genetic testing 5min; total time spent 75min

## 2024-06-11 NOTE — LETTER
06/11/24    Chuck Leon DO  [address]  [place] [address]      Dear Dr. Chuck Leon DO,    I am writing to confirm that your patient, Tom Pyle  received care in my office on 06/11/24. I have enclosed a summary of the care provided to Art for your reference.    Please contact me with any questions you may have regarding the visit.    Sincerely,         Alma Gamez MD  [address]  [facility] [facility] [address]    CC: No Recipients

## 2024-06-12 ENCOUNTER — APPOINTMENT (OUTPATIENT)
Dept: PULMONOLOGY | Facility: CLINIC | Age: 69
End: 2024-06-12
Payer: MEDICARE

## 2024-06-12 VITALS
WEIGHT: 196 LBS | SYSTOLIC BLOOD PRESSURE: 114 MMHG | OXYGEN SATURATION: 94 % | HEART RATE: 64 BPM | HEIGHT: 68 IN | TEMPERATURE: 97.7 F | DIASTOLIC BLOOD PRESSURE: 85 MMHG | BODY MASS INDEX: 29.7 KG/M2

## 2024-06-12 DIAGNOSIS — J41.0 SIMPLE CHRONIC BRONCHITIS (MULTI): ICD-10-CM

## 2024-06-12 DIAGNOSIS — R06.09 DYSPNEA ON EXERTION: ICD-10-CM

## 2024-06-12 DIAGNOSIS — R06.2 WHEEZING: Primary | ICD-10-CM

## 2024-06-12 PROCEDURE — 1036F TOBACCO NON-USER: CPT

## 2024-06-12 PROCEDURE — 99204 OFFICE O/P NEW MOD 45 MIN: CPT

## 2024-06-12 PROCEDURE — 1126F AMNT PAIN NOTED NONE PRSNT: CPT

## 2024-06-12 PROCEDURE — 1159F MED LIST DOCD IN RCRD: CPT

## 2024-06-12 RX ORDER — FLUTICASONE PROPIONATE AND SALMETEROL 100; 50 UG/1; UG/1
1 POWDER RESPIRATORY (INHALATION)
Qty: 60 EACH | Refills: 3 | Status: SHIPPED | OUTPATIENT
Start: 2024-06-12 | End: 2025-06-12

## 2024-06-12 ASSESSMENT — PAIN SCALES - GENERAL: PAINLEVEL: 0-NO PAIN

## 2024-06-12 ASSESSMENT — ENCOUNTER SYMPTOMS
SORE THROAT: 0
PND: 0
RHINORRHEA: 0
WHEEZING: 1
LOSS OF SENSATION IN FEET: 0
ORTHOPNEA: 0
HEADACHES: 0
LEG PAIN: 0
SWOLLEN GLANDS: 0
VOMITING: 0
ABDOMINAL PAIN: 0
NECK PAIN: 0
OCCASIONAL FEELINGS OF UNSTEADINESS: 0
HEMOPTYSIS: 0
SHORTNESS OF BREATH: 1
CLAUDICATION: 0
DEPRESSION: 0
SYNCOPE: 0
FEVER: 0
SPUTUM PRODUCTION: 0

## 2024-06-12 ASSESSMENT — PATIENT HEALTH QUESTIONNAIRE - PHQ9
2. FEELING DOWN, DEPRESSED OR HOPELESS: NOT AT ALL
1. LITTLE INTEREST OR PLEASURE IN DOING THINGS: NOT AT ALL
SUM OF ALL RESPONSES TO PHQ9 QUESTIONS 1 AND 2: 0

## 2024-06-14 ENCOUNTER — APPOINTMENT (OUTPATIENT)
Dept: PRIMARY CARE | Facility: CLINIC | Age: 69
End: 2024-06-14
Payer: MEDICARE

## 2024-06-14 DIAGNOSIS — K21.00 GASTROESOPHAGEAL REFLUX DISEASE WITH ESOPHAGITIS WITHOUT HEMORRHAGE: ICD-10-CM

## 2024-06-14 DIAGNOSIS — R06.02 SOB (SHORTNESS OF BREATH): Primary | ICD-10-CM

## 2024-06-14 PROCEDURE — 99442 PR PHYS/QHP TELEPHONE EVALUATION 11-20 MIN: CPT | Performed by: FAMILY MEDICINE

## 2024-06-14 ASSESSMENT — ENCOUNTER SYMPTOMS
CHEST TIGHTNESS: 0
PND: 0
VOMITING: 0
SINUS PAIN: 0
CONFUSION: 0
SPEECH DIFFICULTY: 0
FATIGUE: 0
EYE PAIN: 0
AGITATION: 0
COLOR CHANGE: 0
COUGH: 0
HEMATURIA: 0
DIARRHEA: 0
CONSTIPATION: 0
ARTHRALGIAS: 0
FEVER: 0
NECK PAIN: 0
RECTAL PAIN: 0
SWOLLEN GLANDS: 0
CONSTITUTIONAL NEGATIVE: 1
DYSURIA: 0
SLEEP DISTURBANCE: 0
HEADACHES: 0
BLOOD IN STOOL: 0
SORE THROAT: 0
DYSPHORIC MOOD: 0
SPUTUM PRODUCTION: 0
SINUS PRESSURE: 0
LEG PAIN: 0
NECK STIFFNESS: 0
MYALGIAS: 0
SHORTNESS OF BREATH: 1
PALPITATIONS: 0
WHEEZING: 1
DECREASED CONCENTRATION: 0
SYNCOPE: 0
SEIZURES: 0
NERVOUS/ANXIOUS: 0
ABDOMINAL DISTENTION: 0
ADENOPATHY: 0
PHOTOPHOBIA: 0
POLYPHAGIA: 0
ACTIVITY CHANGE: 0
ABDOMINAL PAIN: 0
TROUBLE SWALLOWING: 0
POLYDIPSIA: 0
STRIDOR: 0
RHINORRHEA: 0
FLANK PAIN: 0
HEMOPTYSIS: 0
DIZZINESS: 0
ORTHOPNEA: 0
APPETITE CHANGE: 0
CLAUDICATION: 0

## 2024-06-14 NOTE — PROGRESS NOTES
Subjective   Patient ID: Tom Pyle is a 68 y.o. male who presents for Shortness of Breath.    Consent obtained by Tom Pyle for audio communication. Total time for this audio communication visit is 12 minutes.      Patient seeing pulmonology. Currently using rescue inhaler and will start Advair Diskus.    Shortness of Breath  This is a chronic problem. The current episode started more than 1 month ago. The problem occurs rarely. The problem has been unchanged. Associated symptoms include wheezing. Pertinent negatives include no abdominal pain, chest pain, claudication, coryza, ear pain, fever, headaches, hemoptysis, leg pain, leg swelling, neck pain, orthopnea, PND, rash, rhinorrhea, sore throat, sputum production, swollen glands, syncope or vomiting. The symptoms are aggravated by exercise.        Review of Systems   Constitutional: Negative.  Negative for activity change, appetite change, fatigue and fever.   HENT:  Negative for congestion, dental problem, ear discharge, ear pain, mouth sores, rhinorrhea, sinus pressure, sinus pain, sore throat, tinnitus and trouble swallowing.    Eyes:  Negative for photophobia, pain and visual disturbance.   Respiratory:  Positive for shortness of breath and wheezing. Negative for cough, hemoptysis, sputum production, chest tightness and stridor.    Cardiovascular:  Negative for chest pain, palpitations, orthopnea, claudication, leg swelling, syncope and PND.   Gastrointestinal:  Negative for abdominal distention, abdominal pain, blood in stool, constipation, diarrhea, rectal pain and vomiting.   Endocrine: Negative for cold intolerance, heat intolerance, polydipsia, polyphagia and polyuria.   Genitourinary:  Negative for dysuria, flank pain, hematuria and urgency.   Musculoskeletal:  Negative for arthralgias, gait problem, myalgias, neck pain and neck stiffness.   Skin:  Negative for color change and rash.   Allergic/Immunologic: Negative for environmental allergies  and food allergies.   Neurological:  Negative for dizziness, seizures, syncope, speech difficulty and headaches.   Hematological:  Negative for adenopathy.   Psychiatric/Behavioral:  Negative for agitation, confusion, decreased concentration, dysphoric mood and sleep disturbance. The patient is not nervous/anxious.        Objective   There were no vitals taken for this visit.    Physical Exam  Neurological:      Mental Status: He is alert and oriented to person, place, and time.   Psychiatric:         Mood and Affect: Mood normal.         Thought Content: Thought content normal.         Judgment: Judgment normal.       Constitutional: Well developed, well nourished, alert and in no acute distress   Psychiatric: Mood calm and affect normal    Telephone Visit - Audio Communication Only     Assessment/Plan   Problem List Items Addressed This Visit             ICD-10-CM    GERD (gastroesophageal reflux disease) K21.9     Other Visit Diagnoses         Codes    SOB (shortness of breath)    -  Primary R06.02          Scribe Attestation  By signing my name below, IChuck DO , Scribe   attest that this documentation has been prepared under the direction and in the presence of Chuck Leon DO.    Provider Attestation - Scribe documentation    All medical record entries made by the Scribe were at my direction and personally dictated by me. I have reviewed the chart and agree that the record accurately reflects my personal performance of the history, physical exam, discussion and plan.

## 2024-06-14 NOTE — PATIENT INSTRUCTIONS
Follow up in 3 months    Continue current medications and therapy for chronic medical conditions.    Patient was advised importance of proper diet/nutrition in addition adequate hydration. Patient was encouraged moderate exercise program to include 30 minutes daily for 5 days of the week or 150 minutes weekly. Patient will follow-up with us as scheduled.    Review lab results from May 2024    Patient referred to pulmonology

## 2024-06-19 LAB — SCAN RESULT: NORMAL

## 2024-07-11 ENCOUNTER — APPOINTMENT (OUTPATIENT)
Dept: GENETICS | Facility: CLINIC | Age: 69
End: 2024-07-11
Payer: MEDICARE

## 2024-07-11 DIAGNOSIS — Z84.81 FAMILY HISTORY OF BRCA2 GENE POSITIVE: Primary | ICD-10-CM

## 2024-07-11 DIAGNOSIS — Z83.2 FAMILY HISTORY OF BLOOD DISORDER: ICD-10-CM

## 2024-07-11 PROCEDURE — 99214 OFFICE O/P EST MOD 30 MIN: CPT | Performed by: INTERNAL MEDICINE

## 2024-07-11 PROCEDURE — 1123F ACP DISCUSS/DSCN MKR DOCD: CPT | Performed by: INTERNAL MEDICINE

## 2024-07-11 NOTE — PROGRESS NOTES
MEDICAL GENETICS FOLLOW-UP VISIT NOTE    Patient full name: Kevin Pyle  MRN: [id number]  YOB: 1955  Present during this visit: The patient     Primary care/referring provider: Chuck Leon DO     Medical history was obtained from the patient. Prior to this appointment, I reviewed medical records from outpatient medical records and scanned documents, if available. This visit was completed via televideo. All issues as below were discussed and addressed but no physical exam was performed. If it was felt that the patient should be evaluated in clinic then they were directed there. The patient consented to visit.    Interval history:  Mr. Pyle is a 68 y.o. male who returned to Genetics clinic for family history of MDS and BRCA2 mutation. We obtained a next-generation sequencing panel of 70 genes for cancer, including testing of the familial BRCA2 variant, which came back negative. Please refer to my previous consultation note for full HPI, ROS, SH, and physical exam.     Family history:   A five-generation family tree was obtained and scanned to chart.  Pertinent history      Wife (d) breast ca 67     37-yo daughter, no cancer. She was born to his ex-wife. His ex-wife's sister (his daughter's maternal aunt) has breast cancer in her 50s.   Brother (Roxana, Isaias) diagnosed with MDS at 70s s/p BM transplantation. Germline genetic testing identified a pathogenic variant in BRCA2 (see Results).   Brother (tiana Patel) MDS at 70s. No AML.   Mom (d) bilateral breast cancer, first dx 50s.   Mat first cousin has prostate cancer, no mets.   Dad (d) multiple myeloma.  Pat uncle lymphoma  First cousin on pat side, lymphoma     Norwegian  Ashkenazi Hinduism: Both sides    Results:  Germline genetic testing in brother:  Heme Germline MSK IMPACT -- positive. Pathogenic variant detected in BRCA2.  c.5946delT (p.Unt0005Tvafa*22)    Genetic testing in Mr. Pyle   [facility] Multi-Cancer + RNA Panel --  negative  This also includes the c.5946delT (p.[id number]*22  variant in BRCA2 (negative)   Genes tested: AIP, ALK, APC, KRISH, AXIN2, BAP1, BARD1, BLM, BMPR1A, BRCA1, BRCA2, BRIP1, CDC73, CDH1, CDK4, CDKN1B, CDKN2A, CHEK2, CTNNA1, DICER1, EGFR, EPCAM, FH, FLCN, GREM1, HOXB13, KIT, LZTR1, MAX, MBD4, MEN1, MET, MITF, MLH1, MSH2, MSH3, MSH6, MUTYH, NF1, NF2, NTHL1, PALB2, PDGFRA, PMS2, POLD1, POLE, POT1, TIKCR8H, PTCH1, PTEN, RAD51C, RAD51D, RB1, RET, SDHA, SDHAF2, SDHB, SDHC, SDHD, SMAD4, SMARCA4, SMARCB1, SMARCE1, STK11, SUFU, HOHR916, TP53, TSC1, TSC2, VHL    Assessment:  Mr. Pyle is a 68 y.o. male with personal history of melanoma and family history of MDS in brothers, bilateral breast cancer in mother, prostate cancer in maternal first cousin, multiple myeloma in dad, and lymphomas in paternal relatives.     We obtained a next-generation sequencing panel of 70 genes for cancer, which came back negative. He did not inherit the familial c.5946delT (p.[id number]*22) variant in BRCA2 from his brother. He does not have increased cancer risk related to BRCA2 mutations. This is reassuring.     Since there is no identified pathogenic variant, no additional cancer screening is recommended. Mr. Pyle should have age-appropriate cancer screening per his PCP. Other preventative measures such as sunscreen use and no smoking are recommended. He should see Dermatology for history of melanoma.     At present, the genetic explanation of MDS in his brothers is unknown. His brother has had genetic testing for MDS genes, which came back negative (but positive for the BRCA2 mutation). At present, there is no strong evidence that BRCA2 mutations predispose to MDS. The majority of individuals with MDS do not have an identifiable genetic mutation. However, Mr. Pyle's risk of developing MDS is increased, given that two family members are affected. A study (PMID [id number]) has shown that having first-degree relatives  with MDS increases a person's risk of developing MDS by seven times. There is no screening recommendation for first-degree relatives in this scenario to my knowledge. He is getting CBC regularly with PCP. He is interested in having a consultation with a hematologist with expertise in MDS to review whether additional screening and testing are indicated.     With regard to other family members:   - Family members are at increased risk of developing MDS, as discussed. They could discuss this with their PCPs.   - Although it is possible that this BRCA2 mutation is from maternal side, it has not been proven. Given that paternal and maternal sides are of Ashkenazi Jain ancestry, and that this mutation is common in , I recommend that all maternal and paternal relatives seek genetic consultation for this BRCA2 mutation.   - His 37-yo daughter's maternal aunt has breast cancer in her 50s. With limited information, his daughter's breast cancer risk is estimated around 17% (vs. 13% in general population her age) according to the Tyrer-Cuzick model. The risk could be higher if the first period was early, if the breast tissues are dense, among other clinically relevant risk factors. When the risk exceeds 20%, MRI of the breast and a referral to a specialized clinic for personalized breast cancer prevention (such as the  [facility]) is recommended. Since her risk does not exceed 20%, screening mammogram at 40 years of age is appropriate.     Plan:  - Test report provided.   - Age-appropriate cancer screening per PCP.   - See Derm for melanoma.   - Given two family members with MDS, Mr. Pyle could consider seeing Hematology to review whether additional screening and testing are warranted.     Mr. Pyle does not need to see me again in clinic. I am happy to see Mr. Pyle if he has any questions or concerns with regard to genetic evaluation, or if he has new phenotypes that warrant  further genetic evaluation. I am available via [employer].    Thank you for allowing me to participate in the care of this patient. Please do not hesitate to contact me if you have any questions.     Alma Gamez MD    Medical Geneticist  [facility]  Phone: [phone]  Fax: [phone]   Address: [address] [place] [address]  Time spent (this information is required by insurance): face-to-face 18min (10am-10.18am); preparation 5min; documentation 15min; total time spent 38min

## 2024-07-24 ENCOUNTER — APPOINTMENT (OUTPATIENT)
Dept: PULMONOLOGY | Facility: CLINIC | Age: 69
End: 2024-07-24
Payer: MEDICARE

## 2024-07-25 ENCOUNTER — HOSPITAL ENCOUNTER (OUTPATIENT)
Dept: RESPIRATORY THERAPY | Facility: HOSPITAL | Age: 69
Discharge: HOME | End: 2024-07-25
Payer: MEDICARE

## 2024-07-25 DIAGNOSIS — R06.2 WHEEZING: ICD-10-CM

## 2024-07-25 DIAGNOSIS — R06.09 DYSPNEA ON EXERTION: ICD-10-CM

## 2024-07-25 PROCEDURE — 94060 EVALUATION OF WHEEZING: CPT | Performed by: INTERNAL MEDICINE

## 2024-07-25 PROCEDURE — 94726 PLETHYSMOGRAPHY LUNG VOLUMES: CPT

## 2024-07-25 PROCEDURE — 94729 DIFFUSING CAPACITY: CPT | Performed by: INTERNAL MEDICINE

## 2024-07-25 PROCEDURE — 94726 PLETHYSMOGRAPHY LUNG VOLUMES: CPT | Performed by: INTERNAL MEDICINE

## 2024-07-26 ENCOUNTER — APPOINTMENT (OUTPATIENT)
Dept: RESPIRATORY THERAPY | Facility: HOSPITAL | Age: 69
End: 2024-07-26
Payer: MEDICARE

## 2024-07-27 LAB
MGC ASCENT PFT - FEV1 - POST: 3
MGC ASCENT PFT - FEV1 - POST: 3
MGC ASCENT PFT - FEV1 - PRE: 3.3
MGC ASCENT PFT - FEV1 - PRE: 3.3
MGC ASCENT PFT - FEV1 - PREDICTED: 2.92
MGC ASCENT PFT - FEV1 - PREDICTED: 2.92
MGC ASCENT PFT - FVC - POST: 3.65
MGC ASCENT PFT - FVC - POST: 3.65
MGC ASCENT PFT - FVC - PRE: 3.79
MGC ASCENT PFT - FVC - PRE: 3.79
MGC ASCENT PFT - FVC - PREDICTED: 3.81
MGC ASCENT PFT - FVC - PREDICTED: 3.81

## 2024-08-01 NOTE — PROGRESS NOTES
" Patient: Kevin Pyle    [id number]  : 1955 -- AGE 68 y.o.    Provider: Debbie [facility]EL[facility]     [facility]   Service Date: 8/15/24            [facility]  New Visit Note      HISTORY OF PRESENT ILLNESS     The patient's referring provider is: No ref. provider found    HISTORY OF PRESENT ILLNESS   Kevin Pyle \"Art\" is a 68 y.o. male with a history of advanced scoliosis, GERD, hyperlidemia, CHANA,  who is a never smoker , who presents to a [facility] for an initial evaluation for dyspnea on exertion and wheezing.     I have independently interviewed and examined the patient in the office and reviewed available records.    Current History    Since last visit his cough is daily but not frequent. He tried Advair 100 with no improvement in cough. Reports wheezing only with exercise. But has not tried any exercise to see if Advair improved wheezing. Has not tried albuterol before exercise either.  He does have a chest cold so he is coughing a bit more.     Difficulty completed FVC maneuver was unable to complete. Canceled MCCT.   He has Advanced scoliosis.    24: On today's visit, the patient reports he reports he starting wheezing last summer while he was swimming and notices the wheezing and dyspnea when he over exerts himself. He was given albuterol to try with no improvement. He did Spirometry testing in PCPs office that shows poor quality/unable to interpret.   He has a rare dry cough.  He takes omeprazole for GERD with good control.  Denies chest tightness, allergies, night time symptoms and and ER visits for breathing issues.    Previous pulmonary history: He has no history of recurrent infections, or lung disease as a child.  He had no previous lung hx, never on oxygen or inhaler therapy.     Inhalers/nebulized medications: albuterol    Hospitalization History: He has not been hospitalized over the " last year for breathing related problem.    Sleep history: Snoring, Denies apnea, feeling tired during the day or taking naps during the day.      ALLERGIES AND MEDICATIONS     ALLERGIES  No Known Allergies    MEDICATIONS  Current Outpatient Medications   Medication Sig Dispense Refill    albuterol (Ventolin HFA) 90 mcg/actuation inhaler Inhale 2 puffs every 6 hours if needed for wheezing or shortness of breath. 8 g 1    atorvastatin (Lipitor) 20 mg tablet TAKE 1 TABLET(20 MG) BY MOUTH DAILY AT BEDTIME 90 tablet 0    fluticasone propion-salmeteroL (Advair Diskus) 100-50 mcg/dose diskus inhaler Inhale 1 puff 2 times a day. Rinse mouth with water after use to reduce aftertaste and incidence of candidiasis. Do not swallow. 60 each 3    omeprazole (PriLOSEC) 40 mg DR capsule Take 1 capsule (40 mg) by mouth once daily. 90 capsule 1    sildenafil (Viagra) 100 mg tablet take 1 tablet by mouth 1 hour prior to intercourse       No current facility-administered medications for this visit.         PAST HISTORY     PAST MEDICAL HISTORY  GERD  Hyperlipidemia  Iron deficiency anemia  Advanced scoliosis    PAST SURGICAL HISTORY  Past Surgical History:   Procedure Laterality Date    BACK SURGERY      HERNIA REPAIR      OTHER SURGICAL HISTORY  08/30/2021    Hernia repair    OTHER SURGICAL HISTORY  08/30/2021    Back surgery    OTHER SURGICAL HISTORY  08/30/2021    Femur fracture repair    VASECTOMY      WISDOM TOOTH EXTRACTION         IMMUNIZATION HISTORY  Immunization History   Administered Date(s) Administered    Flu vaccine (IIV4), preservative free *Check age/dose* 12/20/2016, 10/15/2021    Flu vaccine, quadrivalent, high-dose, preservative free, age 65y+ (FLUZONE) 12/05/2022, 09/28/2023    Moderna COVID-19 vaccine, bivalent, blue cap/gray label *Check age/dose* 10/04/2022    Moderna SARS-CoV-2 Vaccination 01/11/2021, 02/08/2021, 11/01/2021, 04/11/2022    Pfizer COVID-19 vaccine, Fall 2023, 12 years and older, (30mcg/0.3mL)  09/28/2023    RESPIRATORY SYNCYTIAL VIRUS (RSV), ELIGIBLE PREGNANT PTS, 0.5 ML (ABRYSVO) 09/28/2023    Zoster vaccine, recombinant, adult (SHINGRIX) 11/04/2020, 04/19/2021       SOCIAL HISTORY  Tobacco Smoking: never  Illicit drugs: remote marijuana - now uses edibles  Alcohol consumption: socially  Pets: cat    OCCUPATIONAL/ENVIRONMENTAL HISTORY  Occupation: Retired. Clinical .  No known exposure to asbestos, silica, beryllium or inhaled metals.  No exposure to birds or exotic animals.    FAMILY HISTORY  Family History   Problem Relation Name Age of Onset    Cancer Mother Mother     Breast cancer Mother Mother     Cancer Father Father     Cancer Brother Jorge     Cancer Brother Isaias      No family history of pulmonary disease.  2 Brothers- MDS  Fartewr  multiple myeloma  No family history of autoimmune disorders.    REVIEW OF SYSTEMS     REVIEW OF SYSTEMS  Review of Systems    Constitutional: No fever, no chills, no night sweats.    Eyes: No double vision, no floaters, no dry eyes.   ENT: See HPI.   Neck: No neck stiffness.  Cardiovascular: No sharp chest pain, no heart racing, no leg swelling.  Respiratory: as noted in HPI.   Gastrointestinal: No nausea, no vomiting, no diarrhea.   Musculoskeletal: No joint pain, no back pain.   Integumentary: No rashes or sores.  Neurological: No dizziness, no headaches. Sleeping well.  Psychiatric: No mood changes.   Endocrine: No hot flashes, no cold intolerance, weight is stable.  Hematologic: No easy bruising or bleeding.    PHYSICAL EXAM     VITAL SIGNS:   Vitals:    08/15/24 0953   BP: 113/72   Pulse: 77   Resp: 16   Temp: 36.8 °C (98.2 °F)   SpO2: 94%         CURRENT WEIGHT: Body mass index is 27.76 kg/m².      PREVIOUS WEIGHTS:  Wt Readings from Last 3 Encounters:   06/12/24 88.9 kg (196 lb)   06/11/24 87.5 kg (193 lb)   05/02/24 84.4 kg (186 lb)       Physical Exam    Constitutional: General appearance: Alert and oriented.  No acute distress. Well  developed, well nourished.  Head and face: Symmetric  ENT: external inspection of ear and nose normal. No intranasal polyps. No oropharyngeal exudates.    Oropharynx: normal   Neck: supple, no lymphadenopathy  Pulmonary: Chest is normal. No increased work of breathing or signs of respiratory distress. Clear to auscultation bilaterally - no crackles, wheezing, or rhonchi.   Cardiovascular: Heart rate and rhythm normal. Normal S1, S2 - no murmurs, gallops, or pericardial rub.   Abdomen: Soft, non tender, +BS  Extremities: No edema. No clubbing or cyanosis of the fingernails.    Neurologic: Moves all four extremities   MSK: Normal movements of extremities. Gait normal   Psychiatric: Intact judgement and insight.    RESULTS/DATA     Pulmonary Function Test Results                                                                                       Chest Radiograph     XR chest 2 views 02/22/2024    Narrative  Interpreted By:  Venice Rosales,  STUDY:  Chest, 2 views.    INDICATION:  Signs/Symptoms:shortness of breath.    COMPARISON:  None.    ACCESSION NUMBER(S):  [id number]    ORDERING CLINICIAN:  GIORGIO FREEMAN    FINDINGS:  The cardiomediastinal silhouette size is within normal limits.    There is no focal consolidation, edema or pneumothorax.  No sizeable pleural effusion.    Advanced S shaped thoracolumbar scoliosis noted with dextroscoliosis  centered in the mid thoracic region and levoscoliosis centered in the  mid lumbar region.    Impression  1. No acute cardiopulmonary process.  2. Advanced S shaped thoracolumbar scoliosis.    MACRO:  None.    Signed by: Venice Rosales 2/23/2024 6:15 PM  Dictation workstation:   [id number]      Chest CT Scan     No testing done      Echocardiogram     No testing done        Labwork   Complete Blood Count  Lab Results   Component Value Date    WBC 5.6 05/20/2024    HGB 14.2 05/20/2024    HCT 44.0 05/20/2024    MCV 96 05/20/2024     05/20/2024       Peripheral  "Eosinophil Count/Percentage:   Eosinophils Absolute (x10E9/L)   Date Value   08/01/2023 0.10     Eosinophils % (%)   Date Value   08/01/2023 1.8       Serum Immunoglobulin E:    No results found for: \"IGE\"     ASSESSMENT/PLAN   Mr. Pyle is a 68 y.o. male,  with a history of advanced scoliosis, GERD, hyperlidemia, CHANA,  who is a never smoker , who presents to a [facility] for an initial evaluation for dyspnea on exertion and wheezing.     PFT 2/2024: poor quality  CXR 2/2024: No acute cardiopulmonary process. Advanced S shaped thoracolumbar scoliosis.  EKG 5/2/2024: normal    Most likely exercise induced asthma. Use albuterol as needed and prior to exercise. Can try Advair prior to exercise as well.     Dyspnea likely r/t to advanced scoliosis.    Problem List and Orders  Problem List Items Addressed This Visit    None        Assessment and Plan / Recommendations:  Problem List Items Addressed This Visit    None     Exercise induced asthma  - Advair 100 1 puff BID - rinse mouth after each use - try prior to exercise  - continue albuterol HFA every 4-6 hours as needed for shortness of breath    Follow up in 3 months or sooner if needed.    If you have any questions please call the office [phone]    Thank you for visiting the Pulmonary clinic today!   Debbie Javier CNP  [phone]   "

## 2024-08-08 ENCOUNTER — APPOINTMENT (OUTPATIENT)
Dept: PULMONOLOGY | Facility: CLINIC | Age: 69
End: 2024-08-08
Payer: MEDICARE

## 2024-08-13 ENCOUNTER — HOSPITAL ENCOUNTER (OUTPATIENT)
Dept: RADIOLOGY | Facility: CLINIC | Age: 69
Discharge: HOME | End: 2024-08-13
Payer: MEDICARE

## 2024-08-13 ENCOUNTER — OFFICE VISIT (OUTPATIENT)
Dept: PRIMARY CARE | Facility: CLINIC | Age: 69
End: 2024-08-13
Payer: MEDICARE

## 2024-08-13 VITALS
OXYGEN SATURATION: 100 % | RESPIRATION RATE: 16 BRPM | WEIGHT: 189.2 LBS | HEART RATE: 61 BPM | BODY MASS INDEX: 28.67 KG/M2 | TEMPERATURE: 97 F | HEIGHT: 68 IN | DIASTOLIC BLOOD PRESSURE: 70 MMHG | SYSTOLIC BLOOD PRESSURE: 110 MMHG

## 2024-08-13 DIAGNOSIS — M41.80 ROTOSCOLIOSIS: ICD-10-CM

## 2024-08-13 DIAGNOSIS — M54.6 THORACIC SPINE PAIN: ICD-10-CM

## 2024-08-13 DIAGNOSIS — K21.00 GASTROESOPHAGEAL REFLUX DISEASE WITH ESOPHAGITIS WITHOUT HEMORRHAGE: ICD-10-CM

## 2024-08-13 DIAGNOSIS — M41.00 INFANTILE IDIOPATHIC SCOLIOSIS, UNSPECIFIED SPINAL REGION: ICD-10-CM

## 2024-08-13 DIAGNOSIS — M54.6 THORACIC SPINE PAIN: Primary | ICD-10-CM

## 2024-08-13 PROCEDURE — 1160F RVW MEDS BY RX/DR IN RCRD: CPT | Performed by: FAMILY MEDICINE

## 2024-08-13 PROCEDURE — 72072 X-RAY EXAM THORAC SPINE 3VWS: CPT | Performed by: RADIOLOGY

## 2024-08-13 PROCEDURE — 99214 OFFICE O/P EST MOD 30 MIN: CPT | Performed by: FAMILY MEDICINE

## 2024-08-13 PROCEDURE — 72072 X-RAY EXAM THORAC SPINE 3VWS: CPT

## 2024-08-13 PROCEDURE — 3008F BODY MASS INDEX DOCD: CPT | Performed by: FAMILY MEDICINE

## 2024-08-13 PROCEDURE — 1036F TOBACCO NON-USER: CPT | Performed by: FAMILY MEDICINE

## 2024-08-13 PROCEDURE — 1159F MED LIST DOCD IN RCRD: CPT | Performed by: FAMILY MEDICINE

## 2024-08-13 PROCEDURE — 1123F ACP DISCUSS/DSCN MKR DOCD: CPT | Performed by: FAMILY MEDICINE

## 2024-08-13 RX ORDER — TRAMADOL HYDROCHLORIDE 50 MG/1
50 TABLET ORAL EVERY 6 HOURS PRN
Qty: 25 TABLET | Refills: 0 | Status: SHIPPED | OUTPATIENT
Start: 2024-08-13

## 2024-08-13 RX ORDER — PREDNISONE 10 MG/1
TABLET ORAL
Qty: 25 TABLET | Refills: 0 | Status: SHIPPED | OUTPATIENT
Start: 2024-08-13

## 2024-08-13 RX ORDER — METHOCARBAMOL 500 MG/1
500-1000 TABLET, FILM COATED ORAL 3 TIMES DAILY
Qty: 60 TABLET | Refills: 1 | Status: SHIPPED | OUTPATIENT
Start: 2024-08-13

## 2024-08-13 ASSESSMENT — ENCOUNTER SYMPTOMS
POLYPHAGIA: 0
CONFUSION: 0
PALPITATIONS: 0
SINUS PAIN: 0
DECREASED CONCENTRATION: 0
CONSTIPATION: 0
AGITATION: 0
MYALGIAS: 0
TROUBLE SWALLOWING: 0
DIZZINESS: 0
POLYDIPSIA: 0
RECTAL PAIN: 0
BLOOD IN STOOL: 0
APPETITE CHANGE: 0
DIARRHEA: 0
SHORTNESS OF BREATH: 0
EYE PAIN: 0
ABDOMINAL PAIN: 0
CONSTITUTIONAL NEGATIVE: 1
FLANK PAIN: 0
SEIZURES: 0
STRIDOR: 0
FATIGUE: 0
ARTHRALGIAS: 1
ADENOPATHY: 0
SORE THROAT: 0
HEADACHES: 0
BACK PAIN: 1
DYSURIA: 0
COUGH: 0
CHEST TIGHTNESS: 0
DYSPHORIC MOOD: 0
RHINORRHEA: 0
NERVOUS/ANXIOUS: 0
SLEEP DISTURBANCE: 0
PHOTOPHOBIA: 0
FEVER: 0
COLOR CHANGE: 0
LOSS OF SENSATION IN FEET: 0
ABDOMINAL DISTENTION: 0
OCCASIONAL FEELINGS OF UNSTEADINESS: 0
SINUS PRESSURE: 0
HEMATURIA: 0
NECK STIFFNESS: 0
DEPRESSION: 0
SPEECH DIFFICULTY: 0
ACTIVITY CHANGE: 0

## 2024-08-13 NOTE — PROGRESS NOTES
Subjective   Patient ID: Tom Pyle is a 68 y.o. male who presents for Back Pain.    The patient is present in the office due to back pain. The patient reports that the medication he is taking for pain does not provide enough relief. He indicates that the pain began approximately one month ago and he has attempted multiple medications, heating pads, exercising yet nothing appears to be effective.    Back Pain  This is a new problem. The current episode started 1 to 4 weeks ago. The problem has been waxing and waning since onset. The pain is present in the thoracic spine. The quality of the pain is described as shooting. The pain does not radiate. Pertinent negatives include no abdominal pain, chest pain, dysuria, fever or headaches.        Review of Systems   Constitutional: Negative.  Negative for activity change, appetite change, fatigue and fever.   HENT:  Negative for congestion, dental problem, ear discharge, ear pain, mouth sores, rhinorrhea, sinus pressure, sinus pain, sore throat, tinnitus and trouble swallowing.    Eyes:  Negative for photophobia, pain and visual disturbance.   Respiratory:  Negative for cough, chest tightness, shortness of breath and stridor.    Cardiovascular:  Negative for chest pain and palpitations.   Gastrointestinal:  Negative for abdominal distention, abdominal pain, blood in stool, constipation, diarrhea and rectal pain.   Endocrine: Negative for cold intolerance, heat intolerance, polydipsia, polyphagia and polyuria.   Genitourinary:  Negative for dysuria, flank pain, hematuria and urgency.   Musculoskeletal:  Positive for arthralgias and back pain. Negative for gait problem, myalgias and neck stiffness.   Skin:  Negative for color change and rash.   Allergic/Immunologic: Negative for environmental allergies and food allergies.   Neurological:  Negative for dizziness, seizures, syncope, speech difficulty and headaches.   Hematological:  Negative for adenopathy.  "  Psychiatric/Behavioral:  Negative for agitation, confusion, decreased concentration, dysphoric mood and sleep disturbance. The patient is not nervous/anxious.        Objective   /70 (BP Location: Left arm, Patient Position: Sitting)   Pulse 61   Temp 36.1 °C (97 °F) (Temporal)   Resp 16   Ht 1.727 m (5' 8\")   Wt 85.8 kg (189 lb 3.2 oz)   SpO2 100%   BMI 28.77 kg/m²     Physical Exam  Vitals reviewed.   Constitutional:       General: He is not in acute distress.     Appearance: Normal appearance. He is normal weight. He is not ill-appearing or diaphoretic.   HENT:      Head: Normocephalic.      Right Ear: Tympanic membrane and external ear normal.      Left Ear: Tympanic membrane and external ear normal.      Nose: Nose normal. No congestion.      Mouth/Throat:      Pharynx: No posterior oropharyngeal erythema.   Eyes:      General:         Right eye: No discharge.         Left eye: No discharge.      Extraocular Movements: Extraocular movements intact.      Conjunctiva/sclera: Conjunctivae normal.      Pupils: Pupils are equal, round, and reactive to light.   Cardiovascular:      Rate and Rhythm: Normal rate and regular rhythm.      Pulses: Normal pulses.      Heart sounds: Normal heart sounds. No murmur heard.  Pulmonary:      Effort: Pulmonary effort is normal. No respiratory distress.      Breath sounds: Normal breath sounds. No wheezing or rales.   Chest:      Chest wall: No tenderness.   Abdominal:      General: Abdomen is flat. Bowel sounds are normal. There is no distension.      Palpations: There is no mass.      Tenderness: There is no abdominal tenderness. There is no guarding.   Musculoskeletal:         General: Tenderness present. Normal range of motion.      Cervical back: Normal range of motion and neck supple. No tenderness.      Right lower leg: No edema.      Left lower leg: No edema.   Skin:     General: Skin is dry.      Coloration: Skin is not jaundiced.      Findings: No bruising, " erythema or rash.   Neurological:      General: No focal deficit present.      Mental Status: He is alert and oriented to person, place, and time. Mental status is at baseline.      Cranial Nerves: No cranial nerve deficit.      Sensory: No sensory deficit.      Coordination: Coordination normal.      Gait: Gait normal.   Psychiatric:         Mood and Affect: Mood normal.         Thought Content: Thought content normal.         Judgment: Judgment normal.         Assessment/Plan   Problem List Items Addressed This Visit             ICD-10-CM    GERD (gastroesophageal reflux disease) K21.9     Other Visit Diagnoses         Codes    Thoracic spine pain    -  Primary M54.6    Relevant Medications    predniSONE (Deltasone) 10 mg tablet    methocarbamol (Robaxin) 500 mg tablet    traMADol (Ultram) 50 mg tablet    Other Relevant Orders    XR thoracic spine 3 views    Rotoscoliosis     M41.80    Infantile idiopathic scoliosis, unspecified spinal region     M41.00              Scribe Attestation  By signing my name below, ICinthia RMA, Scribe   attest that this documentation has been prepared under the direction and in the presence of Chuck Leon DO.   Provider Attestation - Scribe documentation    All medical record entries made by the Scribe were at my direction and personally dictated by me. I have reviewed the chart and agree that the record accurately reflects my personal performance of the history, physical exam, discussion and plan.

## 2024-08-13 NOTE — PATIENT INSTRUCTIONS
Follow up in 2 weeks    Continue current medications and therapy for chronic medical conditions.    Patient was advised importance of proper diet/nutrition in addition adequate hydration. Patient was encouraged moderate exercise program to include 30 minutes daily for 5 days of the week or 150 minutes weekly. Patient will follow-up with us as scheduled.    Obtain thoracic x-ray     Start tapering dose of prednisone     Use methocarbamol as needed     Use tramadol as needed    Patient is currently doing physical therapy exercises

## 2024-08-15 ENCOUNTER — APPOINTMENT (OUTPATIENT)
Dept: PULMONOLOGY | Facility: CLINIC | Age: 69
End: 2024-08-15
Payer: MEDICARE

## 2024-08-15 VITALS
DIASTOLIC BLOOD PRESSURE: 72 MMHG | SYSTOLIC BLOOD PRESSURE: 113 MMHG | WEIGHT: 188 LBS | BODY MASS INDEX: 27.85 KG/M2 | RESPIRATION RATE: 16 BRPM | HEART RATE: 77 BPM | HEIGHT: 69 IN | TEMPERATURE: 98.2 F | OXYGEN SATURATION: 94 %

## 2024-08-15 DIAGNOSIS — J45.990 EXERCISE-INDUCED ASTHMA (HHS-HCC): Primary | ICD-10-CM

## 2024-08-15 PROCEDURE — 99214 OFFICE O/P EST MOD 30 MIN: CPT

## 2024-08-15 PROCEDURE — 1159F MED LIST DOCD IN RCRD: CPT

## 2024-08-15 PROCEDURE — 1036F TOBACCO NON-USER: CPT

## 2024-08-15 PROCEDURE — 1123F ACP DISCUSS/DSCN MKR DOCD: CPT

## 2024-08-15 PROCEDURE — 3008F BODY MASS INDEX DOCD: CPT

## 2024-08-20 ASSESSMENT — ENCOUNTER SYMPTOMS
DYSURIA: 0
ABDOMINAL PAIN: 0
BACK PAIN: 1
HEADACHES: 0
PARESTHESIAS: 0
PERIANAL NUMBNESS: 0
TINGLING: 0
WEIGHT LOSS: 0
FEVER: 0
PARESIS: 0
NUMBNESS: 0
BOWEL INCONTINENCE: 0
LEG PAIN: 0
WEAKNESS: 0

## 2024-08-27 ENCOUNTER — APPOINTMENT (OUTPATIENT)
Dept: PRIMARY CARE | Facility: CLINIC | Age: 69
End: 2024-08-27
Payer: MEDICARE

## 2024-08-27 DIAGNOSIS — G89.29 CHRONIC MIDLINE THORACIC BACK PAIN: ICD-10-CM

## 2024-08-27 DIAGNOSIS — M54.6 THORACIC SPINE PAIN: ICD-10-CM

## 2024-08-27 DIAGNOSIS — M54.6 CHRONIC MIDLINE THORACIC BACK PAIN: ICD-10-CM

## 2024-08-27 DIAGNOSIS — M41.80 ROTOSCOLIOSIS: Primary | ICD-10-CM

## 2024-08-27 PROCEDURE — 1158F ADVNC CARE PLAN TLK DOCD: CPT | Performed by: FAMILY MEDICINE

## 2024-08-27 PROCEDURE — 99442 PR PHYS/QHP TELEPHONE EVALUATION 11-20 MIN: CPT | Performed by: FAMILY MEDICINE

## 2024-08-27 PROCEDURE — 1159F MED LIST DOCD IN RCRD: CPT | Performed by: FAMILY MEDICINE

## 2024-08-27 PROCEDURE — 1123F ACP DISCUSS/DSCN MKR DOCD: CPT | Performed by: FAMILY MEDICINE

## 2024-08-27 PROCEDURE — 1160F RVW MEDS BY RX/DR IN RCRD: CPT | Performed by: FAMILY MEDICINE

## 2024-08-27 RX ORDER — DICLOFENAC SODIUM 75 MG/1
75 TABLET, DELAYED RELEASE ORAL 2 TIMES DAILY PRN
Qty: 60 TABLET | Refills: 0 | Status: SHIPPED | OUTPATIENT
Start: 2024-08-27 | End: 2024-10-26

## 2024-08-27 ASSESSMENT — ENCOUNTER SYMPTOMS
FATIGUE: 0
ADENOPATHY: 0
DIZZINESS: 0
DYSURIA: 0
BACK PAIN: 1
COLOR CHANGE: 0
FEVER: 0
SINUS PAIN: 0
DIARRHEA: 0
HEADACHES: 0
FLANK PAIN: 0
ARTHRALGIAS: 1
STRIDOR: 0
PHOTOPHOBIA: 0
NUMBNESS: 0
DECREASED CONCENTRATION: 0
RECTAL PAIN: 0
BLOOD IN STOOL: 0
SPEECH DIFFICULTY: 0
LEG PAIN: 0
WEIGHT LOSS: 0
PARESIS: 0
TINGLING: 0
PARESTHESIAS: 0
ABDOMINAL DISTENTION: 0
SORE THROAT: 0
WEAKNESS: 0
NERVOUS/ANXIOUS: 0
ABDOMINAL PAIN: 0
CONFUSION: 0
APPETITE CHANGE: 0
PERIANAL NUMBNESS: 0
PALPITATIONS: 0
CONSTITUTIONAL NEGATIVE: 1
COUGH: 0
POLYDIPSIA: 0
CHEST TIGHTNESS: 0
DYSPHORIC MOOD: 0
TROUBLE SWALLOWING: 0
NECK STIFFNESS: 0
ACTIVITY CHANGE: 0
SEIZURES: 0
BOWEL INCONTINENCE: 0
RHINORRHEA: 0
HEMATURIA: 0
EYE PAIN: 0
SINUS PRESSURE: 0
SLEEP DISTURBANCE: 0
SHORTNESS OF BREATH: 0
POLYPHAGIA: 0
AGITATION: 0
CONSTIPATION: 0

## 2024-08-27 NOTE — PROGRESS NOTES
Subjective   Patient ID: Tom Pyle is a 68 y.o. male who presents for Back Pain.    The patient is in for his VV for a follow up on his on his back pain. He states he is now feeling discomfort in his back.     The patient would like to go over xray results.     The patient prefers a video call.     Back Pain  This is a recurrent problem. The current episode started more than 1 month ago. The problem occurs 2 to 4 times per day. The pain is present in the lumbar spine. The quality of the pain is described as aching. The pain does not radiate. The pain is at a severity of 5/10. The pain is The same all the time. The symptoms are aggravated by bending, position and twisting. Stiffness is present All day. Pertinent negatives include no abdominal pain, bladder incontinence, bowel incontinence, chest pain, dysuria, fever, headaches, leg pain, numbness, paresis, paresthesias, pelvic pain, perianal numbness, tingling, weakness or weight loss. Risk factors include poor posture and recent trauma.        Review of Systems   Constitutional: Negative.  Negative for activity change, appetite change, fatigue, fever and weight loss.   HENT:  Negative for congestion, dental problem, ear discharge, ear pain, mouth sores, rhinorrhea, sinus pressure, sinus pain, sore throat, tinnitus and trouble swallowing.    Eyes:  Negative for photophobia, pain and visual disturbance.   Respiratory:  Negative for cough, chest tightness, shortness of breath and stridor.    Cardiovascular:  Negative for chest pain and palpitations.   Gastrointestinal:  Negative for abdominal distention, abdominal pain, blood in stool, bowel incontinence, constipation, diarrhea and rectal pain.   Endocrine: Negative for cold intolerance, heat intolerance, polydipsia, polyphagia and polyuria.   Genitourinary:  Negative for bladder incontinence, dysuria, flank pain, hematuria, pelvic pain and urgency.   Musculoskeletal:  Positive for arthralgias, back pain and  myalgias. Negative for gait problem and neck stiffness.   Skin:  Negative for color change and rash.   Allergic/Immunologic: Negative for environmental allergies and food allergies.   Neurological:  Negative for dizziness, tingling, seizures, syncope, speech difficulty, weakness, numbness, headaches and paresthesias.   Hematological:  Negative for adenopathy.   Psychiatric/Behavioral:  Negative for agitation, confusion, decreased concentration, dysphoric mood and sleep disturbance. The patient is not nervous/anxious.        Objective   There were no vitals taken for this visit.    Physical Exam  Neurological:      Mental Status: He is alert and oriented to person, place, and time.   Psychiatric:         Mood and Affect: Mood normal.         Behavior: Behavior normal.         Thought Content: Thought content normal.         Judgment: Judgment normal.       Constitutional: Well developed, well nourished, alert and in no acute distress   Psychiatric: Mood calm and affect normal    Telephone Visit - Audio Communication Only      Assessment/Plan   Problem List Items Addressed This Visit             ICD-10-CM    Thoracic spine pain M54.6    Relevant Medications    diclofenac (Voltaren) 75 mg EC tablet    Other Relevant Orders    Referral to Orthopaedic Surgery    Follow Up In [facility] - PCP - Established    Rotoscoliosis - Primary M41.80    Relevant Medications    diclofenac (Voltaren) 75 mg EC tablet    Other Relevant Orders    Referral to Orthopaedic Surgery    Follow Up [facility] - PCP - Established     Other Visit Diagnoses         Codes    Chronic midline thoracic back pain     M54.6, G89.29          Consent obtained by Tom Pyle for audio communication. Total time for this audio communication visit is 12 minutes.         Scribe Attestation  By signing my name below, ICinthia RMA , Scribe   attest that this documentation has been prepared under the direction and in the  presence of Chuck Leon DO.   Provider Attestation - Scribe documentation    All medical record entries made by the Scribe were at my direction and personally dictated by me. I have reviewed the chart and agree that the record accurately reflects my personal performance of the history, physical exam, discussion and plan.

## 2024-08-27 NOTE — PATIENT INSTRUCTIONS
Follow up in 4 weeks    Continue current medications and therapy for chronic medical conditions.    Patient was advised importance of proper diet/nutrition in addition adequate hydration. Patient was encouraged moderate exercise program to include 30 minutes daily for 5 days of the week or 150 minutes weekly. Patient will follow-up with us as scheduled.    Review x-rays of thoracic spine    Start Diclofenac 75mg twice daily     Continue daily exercise routine    Referred to orthopedics /SPINE

## 2024-08-28 ENCOUNTER — APPOINTMENT (OUTPATIENT)
Dept: PRIMARY CARE | Facility: CLINIC | Age: 69
End: 2024-08-28
Payer: MEDICARE

## 2024-09-02 ASSESSMENT — ENCOUNTER SYMPTOMS: MYALGIAS: 1

## 2024-09-10 ENCOUNTER — PATIENT MESSAGE (OUTPATIENT)
Dept: PRIMARY CARE | Facility: CLINIC | Age: 69
End: 2024-09-10
Payer: MEDICARE

## 2024-09-10 DIAGNOSIS — N52.9 ERECTILE DYSFUNCTION, UNSPECIFIED ERECTILE DYSFUNCTION TYPE: ICD-10-CM

## 2024-09-11 RX ORDER — SILDENAFIL 100 MG/1
100 TABLET, FILM COATED ORAL AS NEEDED
Qty: 10 TABLET | Refills: 1 | Status: SHIPPED | OUTPATIENT
Start: 2024-09-11

## 2024-09-20 DIAGNOSIS — R06.09 DYSPNEA ON EXERTION: Primary | ICD-10-CM

## 2024-09-25 DIAGNOSIS — M54.6 THORACIC SPINE PAIN: ICD-10-CM

## 2024-09-25 DIAGNOSIS — M41.80 ROTOSCOLIOSIS: ICD-10-CM

## 2024-09-25 RX ORDER — DICLOFENAC SODIUM 75 MG/1
TABLET, DELAYED RELEASE ORAL
Qty: 60 TABLET | Refills: 0 | Status: SHIPPED | OUTPATIENT
Start: 2024-09-25

## 2024-09-26 ENCOUNTER — HOSPITAL ENCOUNTER (OUTPATIENT)
Dept: RADIOLOGY | Facility: CLINIC | Age: 69
Discharge: HOME | End: 2024-09-26
Payer: MEDICARE

## 2024-09-26 ENCOUNTER — OFFICE VISIT (OUTPATIENT)
Dept: ORTHOPEDIC SURGERY | Facility: CLINIC | Age: 69
End: 2024-09-26
Payer: MEDICARE

## 2024-09-26 VITALS — BODY MASS INDEX: 27.4 KG/M2 | WEIGHT: 185 LBS | HEIGHT: 69 IN

## 2024-09-26 DIAGNOSIS — M54.6 THORACIC SPINE PAIN: ICD-10-CM

## 2024-09-26 DIAGNOSIS — M41.80 ROTOSCOLIOSIS: ICD-10-CM

## 2024-09-26 DIAGNOSIS — M54.50 LUMBAR PAIN: ICD-10-CM

## 2024-09-26 DIAGNOSIS — M54.50 LUMBAR PAIN: Primary | ICD-10-CM

## 2024-09-26 PROCEDURE — 1159F MED LIST DOCD IN RCRD: CPT | Performed by: ORTHOPAEDIC SURGERY

## 2024-09-26 PROCEDURE — 3008F BODY MASS INDEX DOCD: CPT | Performed by: ORTHOPAEDIC SURGERY

## 2024-09-26 PROCEDURE — 72110 X-RAY EXAM L-2 SPINE 4/>VWS: CPT | Performed by: STUDENT IN AN ORGANIZED HEALTH CARE EDUCATION/TRAINING PROGRAM

## 2024-09-26 PROCEDURE — 99204 OFFICE O/P NEW MOD 45 MIN: CPT | Performed by: ORTHOPAEDIC SURGERY

## 2024-09-26 PROCEDURE — 72110 X-RAY EXAM L-2 SPINE 4/>VWS: CPT

## 2024-09-26 PROCEDURE — 99214 OFFICE O/P EST MOD 30 MIN: CPT | Performed by: ORTHOPAEDIC SURGERY

## 2024-09-26 PROCEDURE — 1123F ACP DISCUSS/DSCN MKR DOCD: CPT | Performed by: ORTHOPAEDIC SURGERY

## 2024-09-26 PROCEDURE — 1036F TOBACCO NON-USER: CPT | Performed by: ORTHOPAEDIC SURGERY

## 2024-09-26 ASSESSMENT — PAIN - FUNCTIONAL ASSESSMENT: PAIN_FUNCTIONAL_ASSESSMENT: 0-10

## 2024-09-26 ASSESSMENT — PAIN SCALES - GENERAL: PAINLEVEL_OUTOF10: 5 - MODERATE PAIN

## 2024-09-26 NOTE — PROGRESS NOTES
"Kevin Pyle \"Art\" is a 68 y.o. male who presents for New Patient Visit of the Middle Back (Was working on his house and bothered his back/Hx of Sx 30 years ago/X-rays at ).    HPI:  68-year-old gentleman here for new patient visit of thoracic back pain.  He denies any fever chills nausea vomiting night sweats.  He has no bowel or bladder complaints.    Physical exam:  Well-nourished, well kept.No lymphangitis or lymphadenopathy in the examined extremities.  Gait normal.  Can stand on heels and toes.   Examination of the back shows tenderness in the paraspinous musculature.  There is no significant decreased range of motion in all directions due to guarding/muscle spasms and pain at extremes.  There is good strength and no instability.  Examination of the lower extremities reveals no point tenderness, swelling, or deformity.  Range of motion of the hips, knees, and ankles are full without crepitance, instability, or exacerbation of pain.  Strength is 5/5 throughout.  No redness, abrasions, or lesions on extremities  Gross sensation intact in the extremities.  Deep tendon reflexes 2+ bilateral. Clonus negative.  Affect normal.  Alert and oriented ×3.  Coordination normal.    Imaging studies:  We ordered and reviewed AP lateral flexion-extension plain films of the lumbar spine.  We reviewed previous thoracic spine films.    Assessment:  68-year-old gentleman here for new patient evaluation of low back and some thoracic spine pain.  This has been going on about 3 months, he was doing a lot of yard work and work around the house and started having low back and some thoracic back pain.  He had back pain 30 years ago, and ended up having a microdiscectomy at L4-5, that resolved without any problem and he was pretty much pain-free since then.  He feels 50% better now than he did when this initially started, he does occasionally get radiating pain down the right leg once or twice a day, but his chief complaint is " back pain.  He has not done any recent conservative treatment, he is doing some home PT stretching exercises.  His family doctor, Dr. Leon put him on anti-inflammatories and muscle relaxers which seem to help.  His x-rays show significant scoliosis throughout the lumbar and thoracic spine.    We have ordered and reviewed tests, x-rays x 2.  The note from Dr. Leon from August 13, 2024 was reviewed, this mentions his back pain.  This is an exacerbation of a chronic problem that is affecting his bodily function.    For complete plan and/or surgical details, please refer to Dr. Nicole's portion of this split dictation.    -Akin Landin PA-C    In a face-to-face encounter, I performed a history and physical examination, discussed pertinent diagnostic studies if indicated, and discussed diagnosis and management strategies with both the patient and the midlevel provider.  I reviewed the midlevel's note and agree with the documented findings and plan of care.    Patient with a lifetime history of scoliosis.  He is known this since he was a child.  He has a flareup of back pain.  Sent by Dr. Leon.  He had a flareup which was quite severe and some muscle relaxers and anti-inflammatories has taken about 50% of his pain away.  He does home exercises but no formal physical therapy.  X-rays show a 65 degree thoracic scoliosis with a compensatory lumbar curve.  He has a L4-5 spondylolisthesis.  This is likely from a prior 1990s microdiscectomy he had at that level.  He has this new acute problem and a flareup which is severe of a chronic problem.  We have ordered and reviewed x-rays today.  We reviewed the notes from Dr. Leon from August 13 which show that he is referring him to me for this problem.  We will get him into physical therapy.  He may try some acupuncture.  He is going to follow-up with us on a as needed basis.    Ishaan Nicole MD  Orthopedic surgery

## 2024-10-01 ENCOUNTER — TELEMEDICINE (OUTPATIENT)
Dept: PRIMARY CARE | Facility: CLINIC | Age: 69
End: 2024-10-01
Payer: MEDICARE

## 2024-10-01 DIAGNOSIS — U07.1 COVID: Primary | ICD-10-CM

## 2024-10-01 PROCEDURE — 99213 OFFICE O/P EST LOW 20 MIN: CPT | Performed by: NURSE PRACTITIONER

## 2024-10-01 PROCEDURE — 1123F ACP DISCUSS/DSCN MKR DOCD: CPT | Performed by: NURSE PRACTITIONER

## 2024-10-01 RX ORDER — NIRMATRELVIR AND RITONAVIR 300-100 MG
3 KIT ORAL 2 TIMES DAILY
Qty: 30 TABLET | Refills: 0 | Status: SHIPPED | OUTPATIENT
Start: 2024-10-01 | End: 2024-10-06

## 2024-10-01 ASSESSMENT — ENCOUNTER SYMPTOMS
FLU SYMPTOMS: 1
HEADACHES: 0
FATIGUE: 1
FEVER: 1
ABDOMINAL PAIN: 0
CHILLS: 0
SORE THROAT: 0
COUGH: 1
VOMITING: 0

## 2024-10-01 NOTE — PROGRESS NOTES
"Subjective   Patient ID: Kevin Pyle \"Art\" is a 68 y.o. male who presents for a Virtual Visit Covid-19 Home Monitoring Video Visit.    Flu Symptoms  This is a new (GF tested positive 3 days ago) problem. The current episode started in the past 7 days (2 days ago). The problem occurs constantly. The problem has been gradually worsening. Associated symptoms include congestion, coughing, fatigue and a fever. Pertinent negatives include no abdominal pain, chest pain, chills, headaches, sore throat or vomiting. Nothing aggravates the symptoms. He has tried drinking for the symptoms. The treatment provided no relief.   Labs reviewed    Review of Systems   Constitutional:  Positive for fatigue and fever. Negative for chills.   HENT:  Positive for congestion. Negative for sore throat.    Respiratory:  Positive for cough.    Cardiovascular:  Negative for chest pain.   Gastrointestinal:  Negative for abdominal pain and vomiting.   Neurological:  Negative for headaches.       Physical Exam not performed  E-visit questionnaire reviewed and discussed with patient.   All questions answered    Assessment/Plan   Problem List Items Addressed This Visit             ICD-10-CM    COVID - Primary U07.1    Relevant Medications    nirmatrelvir-ritonavir (Paxlovid) 300 mg (150 mg x 2)-100 mg tablet therapy pack   HOLD statin until completed  Education provided via MyChart  Discussed with patient   Verbalized understanding, Teach back utilized  Recommend follow up with PCP if new or worsening symptoms       "

## 2024-10-01 NOTE — PATIENT INSTRUCTIONS
A prescription was sent to your pharmacy. Your pharmacist can answer any questions or concerns you may have or you may call the office.    STOP STATIN WHILE TAKING PAXLOVID    Per CDC recommendations  Quarantine 5 days, you must remain fever free for 24 hours without fever reducing medication   Wear mask days 6-10    Treatment  Tylenol for aches and pains, fever  Warm salt water gargles for throat discomfort  Lots of Fluids such as tea with honey, soup, broth, water, Electrolyte replacement drinks  Rest      Preventing Infection    Wash your hands. Wash your hands thoroughly and often with soap and water for at least 20 seconds. If soap and water aren't available, use an alcohol-based hand  that contains at least 60% alcohol. Teach your children the importance of hand-washing. Avoid touching your eyes, nose or mouth with unwashed hands.    Disinfect your stuff. Clean and disinfect high-touch surfaces, such as doorknobs, light switches, electronics, and kitchen and bathroom countertops daily. This is especially important when someone in your family has a cold. Wash children's toys periodically.    Cover your cough. Sneeze and cough into tissues. Throw away used tissues right away, then wash your hands thoroughly. If you don't have a tissue, sneeze or cough into the bend of your elbow and then wash your hands.    Don't share. Don't share drinking glasses or eating utensils with other family members. Use your own glass or disposable cups when you or someone else is sick. Label the cup or glass with the name of the person using it.    Stay away from people with colds. Avoid close contact with anyone who has a cold. Stay out of crowds, when possible. Avoid touching your eyes, nose and mouth.  Review your  center's policies. Look for a  setting with good hygiene practices and clear policies about keeping sick children at home.    Take care of yourself. Eating well and getting exercise and  enough sleep is good for your overall health.

## 2024-10-04 ENCOUNTER — APPOINTMENT (OUTPATIENT)
Dept: PRIMARY CARE | Facility: CLINIC | Age: 69
End: 2024-10-04
Payer: MEDICARE

## 2024-10-04 DIAGNOSIS — R73.9 HYPERGLYCEMIA: ICD-10-CM

## 2024-10-04 DIAGNOSIS — M41.80 ROTOSCOLIOSIS: ICD-10-CM

## 2024-10-04 DIAGNOSIS — E78.2 MIXED HYPERLIPIDEMIA: ICD-10-CM

## 2024-10-04 DIAGNOSIS — M54.6 THORACIC SPINE PAIN: Primary | ICD-10-CM

## 2024-10-04 PROCEDURE — 1036F TOBACCO NON-USER: CPT | Performed by: FAMILY MEDICINE

## 2024-10-04 PROCEDURE — 1123F ACP DISCUSS/DSCN MKR DOCD: CPT | Performed by: FAMILY MEDICINE

## 2024-10-04 PROCEDURE — 99442 PR PHYS/QHP TELEPHONE EVALUATION 11-20 MIN: CPT | Performed by: FAMILY MEDICINE

## 2024-10-04 PROCEDURE — 1159F MED LIST DOCD IN RCRD: CPT | Performed by: FAMILY MEDICINE

## 2024-10-04 PROCEDURE — 1160F RVW MEDS BY RX/DR IN RCRD: CPT | Performed by: FAMILY MEDICINE

## 2024-10-04 ASSESSMENT — ENCOUNTER SYMPTOMS
CONSTIPATION: 0
BACK PAIN: 1
ADENOPATHY: 0
ACTIVITY CHANGE: 0
SORE THROAT: 0
FATIGUE: 0
SHORTNESS OF BREATH: 0
PALPITATIONS: 0
COLOR CHANGE: 0
HEADACHES: 0
POLYDIPSIA: 0
NUMBNESS: 0
CONFUSION: 0
FEVER: 0
DYSURIA: 0
PERIANAL NUMBNESS: 0
DIZZINESS: 0
WEIGHT LOSS: 0
RECTAL PAIN: 0
PHOTOPHOBIA: 0
PARESIS: 0
CONSTITUTIONAL NEGATIVE: 1
FLANK PAIN: 0
AGITATION: 0
ABDOMINAL PAIN: 0
SPEECH DIFFICULTY: 0
STRIDOR: 0
DECREASED CONCENTRATION: 0
EYE PAIN: 0
TROUBLE SWALLOWING: 0
NERVOUS/ANXIOUS: 0
POLYPHAGIA: 0
NECK STIFFNESS: 0
CHEST TIGHTNESS: 0
DIARRHEA: 0
BLOOD IN STOOL: 0
COUGH: 0
SINUS PAIN: 0
WEAKNESS: 0
PARESTHESIAS: 0
SEIZURES: 0
APPETITE CHANGE: 0
RHINORRHEA: 0
TINGLING: 0
SLEEP DISTURBANCE: 0
LEG PAIN: 0
HEMATURIA: 0
DYSPHORIC MOOD: 0
SINUS PRESSURE: 0
ABDOMINAL DISTENTION: 0
BOWEL INCONTINENCE: 0

## 2024-10-04 NOTE — PATIENT INSTRUCTIONS
Follow up in 2 months    Continue current medications and therapy for chronic medical conditions.    Patient was advised importance of proper diet/nutrition in addition adequate hydration. Patient was encouraged moderate exercise program to include 30 minutes daily for 5 days of the week or 150 minutes weekly. Patient will follow-up with us as scheduled.    Obtain fasting lipid, A1C, and CMP labs

## 2024-10-04 NOTE — PROGRESS NOTES
Subjective   Patient ID: Tom Pyle is a 68 y.o. male who presents for Back Pain.    Consent obtained by Tom Pyle for audio communication. Total time for this audio communication visit is 12 minutes.     Back Pain  This is a chronic problem. The current episode started more than 1 month ago. The problem occurs intermittently. The problem has been gradually improving since onset. The pain is present in the gluteal and lumbar spine. The quality of the pain is described as aching. The pain does not radiate. The pain is at a severity of 2/10. The pain is The same all the time. The symptoms are aggravated by twisting. Stiffness is present In the morning. Pertinent negatives include no abdominal pain, bladder incontinence, bowel incontinence, chest pain, dysuria, fever, headaches, leg pain, numbness, paresis, paresthesias, pelvic pain, perianal numbness, tingling, weakness or weight loss.      The patient is following up on his ongoing back pain. He states he has gotten better .     The patient would like to discuss his statin medication.     Review of Systems   Constitutional: Negative.  Negative for activity change, appetite change, fatigue, fever and weight loss.   HENT:  Negative for congestion, dental problem, ear discharge, ear pain, mouth sores, rhinorrhea, sinus pressure, sinus pain, sore throat, tinnitus and trouble swallowing.    Eyes:  Negative for photophobia, pain and visual disturbance.   Respiratory:  Negative for cough, chest tightness, shortness of breath and stridor.    Cardiovascular:  Negative for chest pain and palpitations.   Gastrointestinal:  Negative for abdominal distention, abdominal pain, blood in stool, bowel incontinence, constipation, diarrhea and rectal pain.   Endocrine: Negative for cold intolerance, heat intolerance, polydipsia, polyphagia and polyuria.   Genitourinary:  Negative for bladder incontinence, dysuria, flank pain, hematuria, pelvic pain and urgency.    Musculoskeletal:  Positive for arthralgias, back pain and myalgias. Negative for gait problem and neck stiffness.   Skin:  Negative for color change and rash.   Allergic/Immunologic: Negative for environmental allergies and food allergies.   Neurological:  Negative for dizziness, tingling, seizures, syncope, speech difficulty, weakness, numbness, headaches and paresthesias.   Hematological:  Negative for adenopathy.   Psychiatric/Behavioral:  Negative for agitation, confusion, decreased concentration, dysphoric mood and sleep disturbance. The patient is not nervous/anxious.        Objective   There were no vitals taken for this visit.    Physical Exam  Neurological:      Mental Status: He is alert and oriented to person, place, and time.   Psychiatric:         Mood and Affect: Mood normal.         Behavior: Behavior normal.         Thought Content: Thought content normal.       Constitutional: Well developed, well nourished, alert and in no acute distress   Psychiatric: Mood calm and affect normal    Telephone Visit - Audio Communication Only       Assessment/Plan   Problem List Items Addressed This Visit             ICD-10-CM    Hyperlipidemia E78.5    Relevant Orders    Comprehensive Metabolic Panel    Lipid Panel    Thoracic spine pain - Primary M54.6    Relevant Orders    Follow Up In [facility] - PCP - Established    Rotoscoliosis M41.80     Other Visit Diagnoses         Codes    Hyperglycemia     R73.9    Relevant Orders    Hemoglobin A1c          Scribe Attestation  By signing my name below, I, Latasha Nino MA , Scribe   attest that this documentation has been prepared under the direction and in the presence of Chuck Leon DO.    Provider Attestation - Scribe documentation    All medical record entries made by the Scribe were at my direction and personally dictated by me. I have reviewed the chart and agree that the record accurately reflects my personal performance of the history, physical  exam, discussion and plan.

## 2024-10-06 ASSESSMENT — ENCOUNTER SYMPTOMS
ARTHRALGIAS: 1
MYALGIAS: 1

## 2024-10-11 ENCOUNTER — LAB (OUTPATIENT)
Dept: LAB | Facility: LAB | Age: 69
End: 2024-10-11
Payer: MEDICARE

## 2024-10-11 DIAGNOSIS — E78.2 MIXED HYPERLIPIDEMIA: ICD-10-CM

## 2024-10-11 DIAGNOSIS — R73.9 HYPERGLYCEMIA: ICD-10-CM

## 2024-10-11 LAB
ALBUMIN SERPL BCP-MCNC: 4.4 G/DL (ref 3.4–5)
ALP SERPL-CCNC: 81 U/L (ref 33–136)
ALT SERPL W P-5'-P-CCNC: 29 U/L (ref 10–52)
ANION GAP SERPL CALC-SCNC: 10 MMOL/L (ref 10–20)
AST SERPL W P-5'-P-CCNC: 21 U/L (ref 9–39)
BILIRUB SERPL-MCNC: 0.5 MG/DL (ref 0–1.2)
BUN SERPL-MCNC: 20 MG/DL (ref 6–23)
CALCIUM SERPL-MCNC: 9.7 MG/DL (ref 8.6–10.6)
CHLORIDE SERPL-SCNC: 105 MMOL/L (ref 98–107)
CHOLEST SERPL-MCNC: 211 MG/DL (ref 0–199)
CHOLESTEROL/HDL RATIO: 5
CO2 SERPL-SCNC: 29 MMOL/L (ref 21–32)
CREAT SERPL-MCNC: 0.96 MG/DL (ref 0.5–1.3)
EGFRCR SERPLBLD CKD-EPI 2021: 87 ML/MIN/1.73M*2
EST. AVERAGE GLUCOSE BLD GHB EST-MCNC: 108 MG/DL
GLUCOSE SERPL-MCNC: 113 MG/DL (ref 74–99)
HBA1C MFR BLD: 5.4 %
HDLC SERPL-MCNC: 41.8 MG/DL
LDLC SERPL CALC-MCNC: 127 MG/DL
NON HDL CHOLESTEROL: 169 MG/DL (ref 0–149)
POTASSIUM SERPL-SCNC: 4.2 MMOL/L (ref 3.5–5.3)
PROT SERPL-MCNC: 7.2 G/DL (ref 6.4–8.2)
SODIUM SERPL-SCNC: 140 MMOL/L (ref 136–145)
TRIGL SERPL-MCNC: 209 MG/DL (ref 0–149)
VLDL: 42 MG/DL (ref 0–40)

## 2024-10-11 PROCEDURE — 80053 COMPREHEN METABOLIC PANEL: CPT

## 2024-10-11 PROCEDURE — 83036 HEMOGLOBIN GLYCOSYLATED A1C: CPT

## 2024-10-11 PROCEDURE — 36415 COLL VENOUS BLD VENIPUNCTURE: CPT

## 2024-10-11 PROCEDURE — 80061 LIPID PANEL: CPT

## 2024-11-19 ENCOUNTER — APPOINTMENT (OUTPATIENT)
Dept: PULMONOLOGY | Facility: CLINIC | Age: 69
End: 2024-11-19
Payer: MEDICARE

## 2024-11-20 ENCOUNTER — APPOINTMENT (OUTPATIENT)
Dept: PRIMARY CARE | Facility: CLINIC | Age: 69
End: 2024-11-20
Payer: MEDICARE

## 2024-11-20 VITALS
SYSTOLIC BLOOD PRESSURE: 114 MMHG | TEMPERATURE: 98.3 F | BODY MASS INDEX: 27.7 KG/M2 | DIASTOLIC BLOOD PRESSURE: 64 MMHG | HEIGHT: 69 IN | WEIGHT: 187 LBS | HEART RATE: 66 BPM | RESPIRATION RATE: 14 BRPM | OXYGEN SATURATION: 96 %

## 2024-11-20 DIAGNOSIS — E78.2 MIXED HYPERLIPIDEMIA: ICD-10-CM

## 2024-11-20 DIAGNOSIS — Z23 NEEDS FLU SHOT: ICD-10-CM

## 2024-11-20 DIAGNOSIS — M54.6 THORACIC SPINE PAIN: ICD-10-CM

## 2024-11-20 DIAGNOSIS — S39.012D STRAIN OF LUMBAR REGION, SUBSEQUENT ENCOUNTER: Primary | ICD-10-CM

## 2024-11-20 PROCEDURE — 90662 IIV NO PRSV INCREASED AG IM: CPT | Performed by: FAMILY MEDICINE

## 2024-11-20 PROCEDURE — G2211 COMPLEX E/M VISIT ADD ON: HCPCS | Performed by: FAMILY MEDICINE

## 2024-11-20 PROCEDURE — 99213 OFFICE O/P EST LOW 20 MIN: CPT | Performed by: FAMILY MEDICINE

## 2024-11-20 PROCEDURE — G0008 ADMIN INFLUENZA VIRUS VAC: HCPCS | Performed by: FAMILY MEDICINE

## 2024-11-20 RX ORDER — MELOXICAM 7.5 MG/1
7.5-15 TABLET ORAL DAILY
Qty: 60 TABLET | Refills: 1 | Status: SHIPPED | OUTPATIENT
Start: 2024-11-20 | End: 2025-11-20

## 2024-11-20 RX ORDER — CYCLOBENZAPRINE HCL 5 MG
5-10 TABLET ORAL NIGHTLY PRN
Qty: 60 TABLET | Refills: 0 | Status: SHIPPED | OUTPATIENT
Start: 2024-11-20 | End: 2024-12-20

## 2024-11-20 RX ORDER — ATORVASTATIN CALCIUM 20 MG/1
20 TABLET, FILM COATED ORAL DAILY
Qty: 90 TABLET | Refills: 1 | Status: SHIPPED | OUTPATIENT
Start: 2024-11-20

## 2024-11-20 ASSESSMENT — ENCOUNTER SYMPTOMS
TROUBLE SWALLOWING: 0
NECK STIFFNESS: 0
RHINORRHEA: 0
ABDOMINAL DISTENTION: 0
AGITATION: 0
SHORTNESS OF BREATH: 0
SEIZURES: 0
POLYPHAGIA: 0
BLOOD IN STOOL: 0
STRIDOR: 0
SINUS PAIN: 0
SORE THROAT: 0
ADENOPATHY: 0
SLEEP DISTURBANCE: 0
PALPITATIONS: 0
MYALGIAS: 0
DECREASED CONCENTRATION: 0
SINUS PRESSURE: 0
SPEECH DIFFICULTY: 0
ABDOMINAL PAIN: 0
CONSTIPATION: 0
DYSPHORIC MOOD: 0
HEADACHES: 0
DIZZINESS: 0
PHOTOPHOBIA: 0
DIARRHEA: 0
RECTAL PAIN: 0
CHEST TIGHTNESS: 0
FLANK PAIN: 0
ACTIVITY CHANGE: 0
COUGH: 0
FEVER: 0
CONSTITUTIONAL NEGATIVE: 1
DYSURIA: 0
CONFUSION: 0
EYE PAIN: 0
ARTHRALGIAS: 0
HEMATURIA: 0
POLYDIPSIA: 0
APPETITE CHANGE: 0
COLOR CHANGE: 0
NERVOUS/ANXIOUS: 0
FATIGUE: 0

## 2024-11-20 NOTE — PROGRESS NOTES
Subjective   Patient ID: Tom Pyle is a 69 y.o. male who presents for Hyperlipidemia and Back Pain.    Patient presents today to follow up on hyperlipidemia. Labs completed 10/11/2024. Currently taking Atorvastatin 20mg. Denies any side effects to medication.    Patient is seeing Dr. Nicole for his right low back pain. States pain is slightly improving. 3/10 in pain. No radiation. Doing physical therapy.     Patient would like to discuss influenza vaccine. States he had COVID 2 months ago and was advised to hold until January for his influenza vaccine.          Review of Systems   Constitutional: Negative.  Negative for activity change, appetite change, fatigue and fever.   HENT:  Negative for congestion, dental problem, ear discharge, ear pain, mouth sores, rhinorrhea, sinus pressure, sinus pain, sore throat, tinnitus and trouble swallowing.    Eyes:  Negative for photophobia, pain and visual disturbance.   Respiratory:  Negative for cough, chest tightness, shortness of breath and stridor.    Cardiovascular:  Negative for chest pain and palpitations.   Gastrointestinal:  Negative for abdominal distention, abdominal pain, blood in stool, constipation, diarrhea and rectal pain.   Endocrine: Negative for cold intolerance, heat intolerance, polydipsia, polyphagia and polyuria.   Genitourinary:  Negative for dysuria, flank pain, hematuria and urgency.   Musculoskeletal:  Negative for arthralgias, gait problem, myalgias and neck stiffness.   Skin:  Negative for color change and rash.   Allergic/Immunologic: Negative for environmental allergies and food allergies.   Neurological:  Negative for dizziness, seizures, syncope, speech difficulty and headaches.   Hematological:  Negative for adenopathy.   Psychiatric/Behavioral:  Negative for agitation, confusion, decreased concentration, dysphoric mood and sleep disturbance. The patient is not nervous/anxious.        Objective   /64 (BP Location: Left arm,  "Patient Position: Sitting, BP Cuff Size: Adult)   Pulse 66   Temp 36.8 °C (98.3 °F) (Oral)   Resp 14   Ht 1.753 m (5' 9\")   Wt 84.8 kg (187 lb)   SpO2 96%   BMI 27.62 kg/m²     Physical Exam  Vitals reviewed.   Constitutional:       General: He is not in acute distress.     Appearance: Normal appearance. He is normal weight. He is not ill-appearing or diaphoretic.   HENT:      Head: Normocephalic.      Right Ear: Tympanic membrane and external ear normal.      Left Ear: Tympanic membrane and external ear normal.      Nose: Nose normal. No congestion.      Mouth/Throat:      Pharynx: No posterior oropharyngeal erythema.   Eyes:      General:         Right eye: No discharge.         Left eye: No discharge.      Extraocular Movements: Extraocular movements intact.      Conjunctiva/sclera: Conjunctivae normal.      Pupils: Pupils are equal, round, and reactive to light.   Cardiovascular:      Rate and Rhythm: Normal rate and regular rhythm.      Pulses: Normal pulses.      Heart sounds: Normal heart sounds. No murmur heard.  Pulmonary:      Effort: Pulmonary effort is normal. No respiratory distress.      Breath sounds: Normal breath sounds. No wheezing or rales.   Chest:      Chest wall: No tenderness.   Abdominal:      General: Abdomen is flat. Bowel sounds are normal. There is no distension.      Palpations: There is no mass.      Tenderness: There is no abdominal tenderness. There is no guarding.   Musculoskeletal:         General: Tenderness present. Normal range of motion.      Cervical back: Normal range of motion and neck supple. No tenderness.      Right lower leg: No edema.      Left lower leg: No edema.      Comments: Examination low back does reveal moderate tenderness reproduced over L4-5 S1 centrally as well as significant rotoscoliosis noted of the mid thoracic spine   Skin:     General: Skin is dry.      Coloration: Skin is not jaundiced.      Findings: No bruising, erythema or rash. "   Neurological:      General: No focal deficit present.      Mental Status: He is alert and oriented to person, place, and time. Mental status is at baseline.      Cranial Nerves: No cranial nerve deficit.      Sensory: No sensory deficit.      Coordination: Coordination normal.      Gait: Gait normal.   Psychiatric:         Mood and Affect: Mood normal.         Thought Content: Thought content normal.         Judgment: Judgment normal.         Assessment/Plan   Problem List Items Addressed This Visit             ICD-10-CM    Hyperlipidemia E78.5    Relevant Medications    atorvastatin (Lipitor) 20 mg tablet    Thoracic spine pain M54.6     Other Visit Diagnoses         Codes    Strain of lumbar region, subsequent encounter    -  Primary S39.012D    Relevant Medications    cyclobenzaprine (Flexeril) 5 mg tablet    meloxicam (Mobic) 7.5 mg tablet    Needs flu shot     Z23    Relevant Orders    Flu vaccine, trivalent, preservative free, HIGH-DOSE, age 65y+ (Fluzone) (Completed)          Scribe Attestation  By signing my name below, I, Latasha Nino MA , Scribe   attest that this documentation has been prepared under the direction and in the presence of Chuck Leon DO.    Provider Attestation - Scribe documentation    All medical record entries made by the Scribe were at my direction and personally dictated by me. I have reviewed the chart and agree that the record accurately reflects my personal performance of the history, physical exam, discussion and plan.

## 2024-11-20 NOTE — PATIENT INSTRUCTIONS
Follow up in 4 weeks    Continue current medications and therapy for chronic medical conditions.    Patient was advised importance of proper diet/nutrition in addition adequate hydration. Patient was encouraged moderate exercise program to include 30 minutes daily for 5 days of the week or 150 minutes weekly. Patient will follow-up with us as scheduled.    I personally reviewed the [facility] report for this patient. I have considered the risks of abuse, dependence, addiction, and diversion.    UDS/CSA:    Review lab results from October 2024    Start Flexeril 5 mg 1-2 nightly    Start Mobic 7.5 mg 1 daily    Atorvastatin 20 mg nightly    Administer influenza vaccine

## 2024-12-18 ASSESSMENT — ENCOUNTER SYMPTOMS
HEADACHES: 0
WEIGHT LOSS: 0
TINGLING: 0
DYSURIA: 0
LEG PAIN: 0
ABDOMINAL PAIN: 0
BACK PAIN: 1
BOWEL INCONTINENCE: 0
WEAKNESS: 0
PARESTHESIAS: 0
PERIANAL NUMBNESS: 0
PARESIS: 0
FEVER: 0
NUMBNESS: 0

## 2024-12-19 ENCOUNTER — APPOINTMENT (OUTPATIENT)
Dept: PRIMARY CARE | Facility: CLINIC | Age: 69
End: 2024-12-19
Payer: MEDICARE

## 2024-12-19 VITALS
WEIGHT: 189 LBS | BODY MASS INDEX: 27.99 KG/M2 | DIASTOLIC BLOOD PRESSURE: 64 MMHG | TEMPERATURE: 98.1 F | RESPIRATION RATE: 18 BRPM | OXYGEN SATURATION: 96 % | SYSTOLIC BLOOD PRESSURE: 112 MMHG | HEART RATE: 72 BPM | HEIGHT: 69 IN

## 2024-12-19 DIAGNOSIS — R01.1 CARDIAC MURMUR: ICD-10-CM

## 2024-12-19 DIAGNOSIS — M54.6 CHRONIC MIDLINE THORACIC BACK PAIN: Primary | ICD-10-CM

## 2024-12-19 DIAGNOSIS — G89.29 CHRONIC MIDLINE THORACIC BACK PAIN: Primary | ICD-10-CM

## 2024-12-19 DIAGNOSIS — E78.2 MIXED HYPERLIPIDEMIA: ICD-10-CM

## 2024-12-19 DIAGNOSIS — I71.21 ANEURYSM OF ASCENDING AORTA WITHOUT RUPTURE (CMS-HCC): ICD-10-CM

## 2024-12-19 PROCEDURE — G2211 COMPLEX E/M VISIT ADD ON: HCPCS | Performed by: FAMILY MEDICINE

## 2024-12-19 PROCEDURE — 99214 OFFICE O/P EST MOD 30 MIN: CPT | Performed by: FAMILY MEDICINE

## 2024-12-19 ASSESSMENT — ENCOUNTER SYMPTOMS
LEG PAIN: 0
NECK STIFFNESS: 0
CONSTIPATION: 0
COUGH: 0
DECREASED CONCENTRATION: 0
SHORTNESS OF BREATH: 0
APPETITE CHANGE: 0
DIARRHEA: 0
PARESIS: 0
RECTAL PAIN: 0
ARTHRALGIAS: 1
ADENOPATHY: 0
SPEECH DIFFICULTY: 0
BOWEL INCONTINENCE: 0
WEIGHT LOSS: 0
ACTIVITY CHANGE: 0
DIZZINESS: 0
DYSPHORIC MOOD: 0
SLEEP DISTURBANCE: 0
COLOR CHANGE: 0
CONFUSION: 0
TROUBLE SWALLOWING: 0
HEMATURIA: 0
SEIZURES: 0
PERIANAL NUMBNESS: 0
STRIDOR: 0
TINGLING: 0
CHEST TIGHTNESS: 0
FEVER: 0
NERVOUS/ANXIOUS: 0
RHINORRHEA: 0
AGITATION: 0
FATIGUE: 0
PALPITATIONS: 0
HEADACHES: 0
FLANK PAIN: 0
ABDOMINAL PAIN: 0
WEAKNESS: 0
DYSURIA: 0
PARESTHESIAS: 0
PHOTOPHOBIA: 0
SINUS PAIN: 0
BACK PAIN: 1
SINUS PRESSURE: 0
NUMBNESS: 0
EYE PAIN: 0
CONSTITUTIONAL NEGATIVE: 1
SORE THROAT: 0
BLOOD IN STOOL: 0
POLYDIPSIA: 0
ABDOMINAL DISTENTION: 0
POLYPHAGIA: 0
MYALGIAS: 0

## 2024-12-19 NOTE — PROGRESS NOTES
Subjective   Patient ID: Tom Pyle is a 69 y.o. male who presents for Hyperlipidemia and Back Pain.    Patient denies any symptoms or concern today.    Back Pain  This is a chronic problem. The current episode started more than 1 month ago. The problem occurs constantly. The problem has been gradually improving since onset. The pain is present in the sacro-iliac. The quality of the pain is described as aching and cramping. The pain is at a severity of 3/10. The pain is The same all the time. The symptoms are aggravated by bending, position and twisting. Stiffness is present In the morning, at night and all day. Pertinent negatives include no abdominal pain, bladder incontinence, bowel incontinence, chest pain, dysuria, fever, headaches, leg pain, numbness, paresis, paresthesias, pelvic pain, perianal numbness, tingling, weakness or weight loss. Risk factors include poor posture.   Hyperlipidemia  This is a recurrent problem. The current episode started more than 1 year ago. The problem is uncontrolled. Recent lipid tests were reviewed and are high. Pertinent negatives include no chest pain, leg pain, myalgias or shortness of breath. Current antihyperlipidemic treatment includes statins. The current treatment provides no improvement of lipids.        Review of Systems   Constitutional: Negative.  Negative for activity change, appetite change, fatigue, fever and weight loss.   HENT:  Negative for congestion, dental problem, ear discharge, ear pain, mouth sores, rhinorrhea, sinus pressure, sinus pain, sore throat, tinnitus and trouble swallowing.    Eyes:  Negative for photophobia, pain and visual disturbance.   Respiratory:  Negative for cough, chest tightness, shortness of breath and stridor.    Cardiovascular:  Negative for chest pain and palpitations.   Gastrointestinal:  Negative for abdominal distention, abdominal pain, blood in stool, bowel incontinence, constipation, diarrhea and rectal pain.   Endocrine:  "Negative for cold intolerance, heat intolerance, polydipsia, polyphagia and polyuria.   Genitourinary:  Negative for bladder incontinence, dysuria, flank pain, hematuria, pelvic pain and urgency.   Musculoskeletal:  Positive for arthralgias and back pain. Negative for gait problem, myalgias and neck stiffness.   Skin:  Negative for color change and rash.   Allergic/Immunologic: Negative for environmental allergies and food allergies.   Neurological:  Negative for dizziness, tingling, seizures, syncope, speech difficulty, weakness, numbness, headaches and paresthesias.   Hematological:  Negative for adenopathy.   Psychiatric/Behavioral:  Negative for agitation, confusion, decreased concentration, dysphoric mood and sleep disturbance. The patient is not nervous/anxious.        Objective   /64 (BP Location: Right arm, Patient Position: Sitting, BP Cuff Size: Adult)   Pulse 72   Temp 36.7 °C (98.1 °F) (Skin)   Resp 18   Ht 1.753 m (5' 9\")   Wt 85.7 kg (189 lb)   SpO2 96%   BMI 27.91 kg/m²     Physical Exam  Vitals reviewed.   Constitutional:       General: He is not in acute distress.     Appearance: Normal appearance. He is normal weight. He is not ill-appearing or diaphoretic.   HENT:      Head: Normocephalic.      Right Ear: Tympanic membrane and external ear normal.      Left Ear: Tympanic membrane and external ear normal.      Nose: Nose normal. No congestion.      Mouth/Throat:      Pharynx: No posterior oropharyngeal erythema.   Eyes:      General:         Right eye: No discharge.         Left eye: No discharge.      Extraocular Movements: Extraocular movements intact.      Conjunctiva/sclera: Conjunctivae normal.      Pupils: Pupils are equal, round, and reactive to light.   Cardiovascular:      Rate and Rhythm: Normal rate and regular rhythm.      Pulses: Normal pulses.      Heart sounds: Normal heart sounds. No murmur heard.  Pulmonary:      Effort: Pulmonary effort is normal. No respiratory " distress.      Breath sounds: Normal breath sounds. No wheezing or rales.   Chest:      Chest wall: No tenderness.   Abdominal:      General: Abdomen is flat. Bowel sounds are normal. There is no distension.      Palpations: There is no mass.      Tenderness: There is no abdominal tenderness. There is no guarding.   Musculoskeletal:         General: Tenderness present. Normal range of motion.      Cervical back: Normal range of motion and neck supple. No tenderness.      Right lower leg: No edema.      Left lower leg: No edema.      Comments: Severe rotoscoliosis of the thoracic spine   Skin:     General: Skin is dry.      Coloration: Skin is not jaundiced.      Findings: No bruising, erythema or rash.   Neurological:      General: No focal deficit present.      Mental Status: He is alert and oriented to person, place, and time. Mental status is at baseline.      Cranial Nerves: No cranial nerve deficit.      Sensory: No sensory deficit.      Coordination: Coordination abnormal.      Gait: Gait abnormal.   Psychiatric:         Mood and Affect: Mood normal.         Thought Content: Thought content normal.         Judgment: Judgment normal.         Assessment/Plan   Problem List Items Addressed This Visit             ICD-10-CM    Hyperlipidemia E78.5    Relevant Orders    Comprehensive Metabolic Panel (Completed)    Lipid Panel (Completed)    Cardiac murmur R01.1    Relevant Orders    Transthoracic Echo (TTE) Complete     Other Visit Diagnoses         Codes    Chronic midline thoracic back pain    -  Primary M54.6, G89.29    Relevant Orders    Referral to Physical Therapy    Aneurysm of ascending aorta without rupture (CMS-HCC)     I71.21    Relevant Orders    CT angio chest w and wo IV contrast              Scribe Attestation  By signing my name below, Cinthia ADAM RMA , Scribe   attest that this documentation has been prepared under the direction and in the presence of Chuck Leon DO.   Provider  Attestation - Scribe documentation    All medical record entries made by the Scribe were at my direction and personally dictated by me. I have reviewed the chart and agree that the record accurately reflects my personal performance of the history, physical exam, discussion and plan.

## 2024-12-19 NOTE — PATIENT INSTRUCTIONS
Follow up in 3 months    Continue current medications and therapy for chronic medical conditions.    Patient was advised importance of proper diet/nutrition in addition adequate hydration. Patient was encouraged moderate exercise program to include 30 minutes daily for 5 days of the week or 150 minutes weekly. Patient will follow-up with us as scheduled.    Referred to physical therapy     Obtain Fasting Lipid and CMP in February    Obtain transthoracic echocardiogram    Schedule CT angio of the chest with and without contrast

## 2024-12-20 ENCOUNTER — LAB (OUTPATIENT)
Dept: LAB | Facility: LAB | Age: 69
End: 2024-12-20
Payer: MEDICARE

## 2024-12-20 DIAGNOSIS — E78.2 MIXED HYPERLIPIDEMIA: ICD-10-CM

## 2024-12-20 LAB
ALBUMIN SERPL BCP-MCNC: 4.3 G/DL (ref 3.4–5)
ALP SERPL-CCNC: 90 U/L (ref 33–136)
ALT SERPL W P-5'-P-CCNC: 28 U/L (ref 10–52)
ANION GAP SERPL CALC-SCNC: 13 MMOL/L (ref 10–20)
AST SERPL W P-5'-P-CCNC: 22 U/L (ref 9–39)
BILIRUB SERPL-MCNC: 0.7 MG/DL (ref 0–1.2)
BUN SERPL-MCNC: 18 MG/DL (ref 6–23)
CALCIUM SERPL-MCNC: 9.7 MG/DL (ref 8.6–10.6)
CHLORIDE SERPL-SCNC: 105 MMOL/L (ref 98–107)
CHOLEST SERPL-MCNC: 222 MG/DL (ref 0–199)
CHOLESTEROL/HDL RATIO: 4.1
CO2 SERPL-SCNC: 28 MMOL/L (ref 21–32)
CREAT SERPL-MCNC: 0.92 MG/DL (ref 0.5–1.3)
EGFRCR SERPLBLD CKD-EPI 2021: >90 ML/MIN/1.73M*2
GLUCOSE SERPL-MCNC: 98 MG/DL (ref 74–99)
HDLC SERPL-MCNC: 54.3 MG/DL
LDLC SERPL CALC-MCNC: 142 MG/DL
NON HDL CHOLESTEROL: 168 MG/DL (ref 0–149)
POTASSIUM SERPL-SCNC: 4.2 MMOL/L (ref 3.5–5.3)
PROT SERPL-MCNC: 7.4 G/DL (ref 6.4–8.2)
SODIUM SERPL-SCNC: 142 MMOL/L (ref 136–145)
TRIGL SERPL-MCNC: 130 MG/DL (ref 0–149)
VLDL: 26 MG/DL (ref 0–40)

## 2024-12-20 PROCEDURE — 80061 LIPID PANEL: CPT

## 2024-12-20 PROCEDURE — 36415 COLL VENOUS BLD VENIPUNCTURE: CPT

## 2024-12-20 PROCEDURE — 80053 COMPREHEN METABOLIC PANEL: CPT

## 2025-01-06 ENCOUNTER — HOSPITAL ENCOUNTER (OUTPATIENT)
Dept: RADIOLOGY | Facility: CLINIC | Age: 70
Discharge: HOME | End: 2025-01-06
Payer: COMMERCIAL

## 2025-01-06 DIAGNOSIS — I71.21 ANEURYSM OF ASCENDING AORTA WITHOUT RUPTURE (CMS-HCC): ICD-10-CM

## 2025-01-06 PROCEDURE — 2550000001 HC RX 255 CONTRASTS: Performed by: FAMILY MEDICINE

## 2025-01-06 PROCEDURE — 71275 CT ANGIOGRAPHY CHEST: CPT

## 2025-01-06 RX ADMIN — IOHEXOL 90 ML: 350 INJECTION, SOLUTION INTRAVENOUS at 10:30

## 2025-01-07 ENCOUNTER — PATIENT MESSAGE (OUTPATIENT)
Dept: PRIMARY CARE | Facility: CLINIC | Age: 70
End: 2025-01-07
Payer: COMMERCIAL

## 2025-01-15 ENCOUNTER — HOSPITAL ENCOUNTER (OUTPATIENT)
Dept: CARDIOLOGY | Facility: CLINIC | Age: 70
Discharge: HOME | End: 2025-01-15
Payer: COMMERCIAL

## 2025-01-15 DIAGNOSIS — R01.1 CARDIAC MURMUR: ICD-10-CM

## 2025-01-15 LAB
AORTIC VALVE PEAK VELOCITY: 1.36 M/S
AV PEAK GRADIENT: 7 MMHG
AVA (PEAK VEL): 4.13 CM2
EJECTION FRACTION APICAL 4 CHAMBER: 59.6
EJECTION FRACTION: 63 %
LEFT ATRIUM VOLUME AREA LENGTH INDEX BSA: 17.5 ML/M2
LEFT VENTRICLE INTERNAL DIMENSION DIASTOLE: 4.09 CM (ref 3.5–6)
LEFT VENTRICULAR OUTFLOW TRACT DIAMETER: 2.38 CM
MITRAL VALVE E/A RATIO: 0.77
RIGHT VENTRICLE FREE WALL PEAK S': 10 CM/S
TRICUSPID ANNULAR PLANE SYSTOLIC EXCURSION: 2.3 CM

## 2025-01-15 PROCEDURE — 93306 TTE W/DOPPLER COMPLETE: CPT

## 2025-01-15 PROCEDURE — 93306 TTE W/DOPPLER COMPLETE: CPT | Performed by: INTERNAL MEDICINE

## 2025-01-27 ENCOUNTER — APPOINTMENT (OUTPATIENT)
Dept: PRIMARY CARE | Facility: CLINIC | Age: 70
End: 2025-01-27
Payer: COMMERCIAL

## 2025-01-27 VITALS
RESPIRATION RATE: 19 BRPM | OXYGEN SATURATION: 98 % | DIASTOLIC BLOOD PRESSURE: 72 MMHG | WEIGHT: 194 LBS | SYSTOLIC BLOOD PRESSURE: 118 MMHG | HEART RATE: 78 BPM | TEMPERATURE: 98.2 F | HEIGHT: 69 IN | BODY MASS INDEX: 28.73 KG/M2

## 2025-01-27 DIAGNOSIS — E78.2 MIXED HYPERLIPIDEMIA: Primary | ICD-10-CM

## 2025-01-27 DIAGNOSIS — Z23 ENCOUNTER FOR IMMUNIZATION: ICD-10-CM

## 2025-01-27 DIAGNOSIS — K21.9 GASTROESOPHAGEAL REFLUX DISEASE, UNSPECIFIED WHETHER ESOPHAGITIS PRESENT: ICD-10-CM

## 2025-01-27 DIAGNOSIS — J41.0 SIMPLE CHRONIC BRONCHITIS (MULTI): ICD-10-CM

## 2025-01-27 PROCEDURE — G2211 COMPLEX E/M VISIT ADD ON: HCPCS | Performed by: FAMILY MEDICINE

## 2025-01-27 PROCEDURE — 90677 PCV20 VACCINE IM: CPT | Performed by: FAMILY MEDICINE

## 2025-01-27 PROCEDURE — G0009 ADMIN PNEUMOCOCCAL VACCINE: HCPCS | Performed by: FAMILY MEDICINE

## 2025-01-27 PROCEDURE — 99213 OFFICE O/P EST LOW 20 MIN: CPT | Performed by: FAMILY MEDICINE

## 2025-01-27 RX ORDER — OMEPRAZOLE 40 MG/1
40 CAPSULE, DELAYED RELEASE ORAL DAILY
Qty: 90 CAPSULE | Refills: 1 | Status: SHIPPED | OUTPATIENT
Start: 2025-01-27

## 2025-01-27 ASSESSMENT — ENCOUNTER SYMPTOMS
ARTHRALGIAS: 0
HEMATURIA: 0
DYSPHORIC MOOD: 0
ACTIVITY CHANGE: 0
ABDOMINAL DISTENTION: 0
ADENOPATHY: 0
SINUS PRESSURE: 0
FLANK PAIN: 0
CHEST TIGHTNESS: 0
SHORTNESS OF BREATH: 0
PHOTOPHOBIA: 0
AGITATION: 0
SINUS PAIN: 0
CONSTITUTIONAL NEGATIVE: 1
CONSTIPATION: 0
SEIZURES: 0
DIARRHEA: 0
PALPITATIONS: 0
SLEEP DISTURBANCE: 0
NERVOUS/ANXIOUS: 0
NECK STIFFNESS: 0
HEADACHES: 0
RECTAL PAIN: 0
COUGH: 0
COLOR CHANGE: 0
POLYDIPSIA: 0
DECREASED CONCENTRATION: 0
ABDOMINAL PAIN: 0
SPEECH DIFFICULTY: 0
RHINORRHEA: 0
FATIGUE: 0
EYE PAIN: 0
DYSURIA: 0
FEVER: 0
BLOOD IN STOOL: 0
TROUBLE SWALLOWING: 0
STRIDOR: 0
APPETITE CHANGE: 0
SORE THROAT: 0
POLYPHAGIA: 0
CONFUSION: 0
DIZZINESS: 0
MYALGIAS: 0

## 2025-01-27 NOTE — PATIENT INSTRUCTIONS
Follow up in 4.5 months     Continue current medications and therapy for chronic medical conditions.    Patient was advised importance of proper diet/nutrition in addition adequate hydration. Patient was encouraged moderate exercise program to include 30 minutes daily for 5 days of the week or 150 minutes weekly. Patient will follow-up with us as scheduled.    Review lab results from December 2024    Reviewed CT angiogram of the chest from January 2025    Prevnar 20 today     Omeprazole 40 mg daily

## 2025-01-27 NOTE — PROGRESS NOTES
Subjective   Patient ID: Tom Pyle is a 69 y.o. male who presents for Hyperlipidemia.    Patient is present to discuss test results.    Patient had done CT angio chest on 01/06/2025  Patient had done a Transthoracic Echo on 01/15/2025    Patient would like discuss his medication dose and use before refills.    Patient denies any symptoms or concerns today.    Hyperlipidemia  This is a recurrent problem. The current episode started more than 1 year ago. The problem is uncontrolled. Recent lipid tests were reviewed and are high. He has no history of diabetes. There are no known factors aggravating his hyperlipidemia. Pertinent negatives include no chest pain, myalgias or shortness of breath.        Review of Systems   Constitutional: Negative.  Negative for activity change, appetite change, fatigue and fever.   HENT:  Negative for congestion, dental problem, ear discharge, ear pain, mouth sores, rhinorrhea, sinus pressure, sinus pain, sore throat, tinnitus and trouble swallowing.    Eyes:  Negative for photophobia, pain and visual disturbance.   Respiratory:  Negative for cough, chest tightness, shortness of breath and stridor.    Cardiovascular:  Negative for chest pain and palpitations.   Gastrointestinal:  Negative for abdominal distention, abdominal pain, blood in stool, constipation, diarrhea and rectal pain.   Endocrine: Negative for cold intolerance, heat intolerance, polydipsia, polyphagia and polyuria.   Genitourinary:  Negative for dysuria, flank pain, hematuria and urgency.   Musculoskeletal:  Negative for arthralgias, gait problem, myalgias and neck stiffness.   Skin:  Negative for color change and rash.   Allergic/Immunologic: Negative for environmental allergies and food allergies.   Neurological:  Negative for dizziness, seizures, syncope, speech difficulty and headaches.   Hematological:  Negative for adenopathy.   Psychiatric/Behavioral:  Negative for agitation, confusion, decreased  "concentration, dysphoric mood and sleep disturbance. The patient is not nervous/anxious.        Objective   /72 (BP Location: Right arm, Patient Position: Sitting, BP Cuff Size: Adult)   Pulse 78   Temp 36.8 °C (98.2 °F) (Skin)   Resp 19   Ht 1.753 m (5' 9\")   Wt 88 kg (194 lb)   SpO2 98%   BMI 28.65 kg/m²     Physical Exam  Vitals reviewed.   Constitutional:       General: He is not in acute distress.     Appearance: Normal appearance. He is normal weight. He is not ill-appearing or diaphoretic.   HENT:      Head: Normocephalic.      Right Ear: Tympanic membrane and external ear normal.      Left Ear: Tympanic membrane and external ear normal.      Nose: Nose normal. No congestion.      Mouth/Throat:      Pharynx: No posterior oropharyngeal erythema.   Eyes:      General:         Right eye: No discharge.         Left eye: No discharge.      Extraocular Movements: Extraocular movements intact.      Conjunctiva/sclera: Conjunctivae normal.      Pupils: Pupils are equal, round, and reactive to light.   Cardiovascular:      Rate and Rhythm: Normal rate and regular rhythm.      Pulses: Normal pulses.      Heart sounds: Normal heart sounds. No murmur heard.  Pulmonary:      Effort: Pulmonary effort is normal. No respiratory distress.      Breath sounds: Normal breath sounds. No wheezing or rales.   Chest:      Chest wall: No tenderness.   Abdominal:      General: Abdomen is flat. Bowel sounds are normal. There is no distension.      Palpations: There is no mass.      Tenderness: There is no abdominal tenderness. There is no guarding.   Musculoskeletal:         General: No tenderness. Normal range of motion.      Cervical back: Normal range of motion and neck supple. No tenderness.      Right lower leg: No edema.      Left lower leg: No edema.   Skin:     General: Skin is dry.      Coloration: Skin is not jaundiced.      Findings: No bruising, erythema or rash.   Neurological:      General: No focal deficit " present.      Mental Status: He is alert and oriented to person, place, and time. Mental status is at baseline.      Cranial Nerves: No cranial nerve deficit.      Sensory: No sensory deficit.      Coordination: Coordination normal.      Gait: Gait normal.   Psychiatric:         Mood and Affect: Mood normal.         Thought Content: Thought content normal.         Judgment: Judgment normal.         Assessment/Plan   Problem List Items Addressed This Visit             ICD-10-CM    Hyperlipidemia - Primary E78.5    GERD (gastroesophageal reflux disease) K21.9    Relevant Medications    omeprazole (PriLOSEC) 40 mg DR capsule    Simple chronic bronchitis (Multi) J41.0     Stable/monitored          Other Visit Diagnoses         Codes    Encounter for immunization     Z23    Relevant Orders    Pneumococcal conjugate vaccine, 20-valent (PREVNAR 20) (Completed)              Scribe Attestation  By signing my name below, ICinthia RMA , Veronica   attest that this documentation has been prepared under the direction and in the presence of Chuck Leon DO.   Provider Attestation - Scribe documentation    All medical record entries made by the Scribe were at my direction and personally dictated by me. I have reviewed the chart and agree that the record accurately reflects my personal performance of the history, physical exam, discussion and plan.

## 2025-02-02 PROBLEM — J41.0 SIMPLE CHRONIC BRONCHITIS (MULTI): Status: ACTIVE | Noted: 2025-02-02

## 2025-05-21 DIAGNOSIS — E78.2 MIXED HYPERLIPIDEMIA: ICD-10-CM

## 2025-05-21 RX ORDER — ATORVASTATIN CALCIUM 20 MG/1
20 TABLET, FILM COATED ORAL DAILY
Qty: 90 TABLET | Refills: 0 | Status: SHIPPED | OUTPATIENT
Start: 2025-05-21

## 2025-05-21 NOTE — TELEPHONE ENCOUNTER
Recent Visits  Date Type Provider Dept   01/27/25 Office Visit Chuck Leon, DO [facility]   12/19/24 Office Visit Chuck Leon, DO [facility]   11/20/24 Office Visit Chuck Leon, DO [facility]   08/13/24 Office Visit Chuck Leon, DO [facility]   Showing recent visits within past 365 days and meeting all other requirements  Future Appointments  Date Type Provider Dept   06/03/25 Appointment Chuck Leon, DO [facility]   Showing future appointments within next 90 days and meeting all other requirements

## 2025-06-03 ENCOUNTER — APPOINTMENT (OUTPATIENT)
Dept: PRIMARY CARE | Facility: CLINIC | Age: 70
End: 2025-06-03
Payer: COMMERCIAL

## 2025-06-03 VITALS
BODY MASS INDEX: 29.03 KG/M2 | HEART RATE: 63 BPM | WEIGHT: 196 LBS | SYSTOLIC BLOOD PRESSURE: 128 MMHG | DIASTOLIC BLOOD PRESSURE: 80 MMHG | TEMPERATURE: 98.2 F | OXYGEN SATURATION: 98 % | RESPIRATION RATE: 18 BRPM | HEIGHT: 69 IN

## 2025-06-03 DIAGNOSIS — Z00.00 MEDICARE ANNUAL WELLNESS VISIT, SUBSEQUENT: Primary | ICD-10-CM

## 2025-06-03 DIAGNOSIS — M41.35 THORACOGENIC SCOLIOSIS OF THORACOLUMBAR REGION: ICD-10-CM

## 2025-06-03 DIAGNOSIS — K21.00 GASTROESOPHAGEAL REFLUX DISEASE WITH ESOPHAGITIS WITHOUT HEMORRHAGE: ICD-10-CM

## 2025-06-03 DIAGNOSIS — E55.9 VITAMIN D DEFICIENCY: ICD-10-CM

## 2025-06-03 DIAGNOSIS — E78.2 MIXED HYPERLIPIDEMIA: ICD-10-CM

## 2025-06-03 PROCEDURE — G0439 PPPS, SUBSEQ VISIT: HCPCS | Performed by: FAMILY MEDICINE

## 2025-06-03 PROCEDURE — 99497 ADVNCD CARE PLAN 30 MIN: CPT | Performed by: FAMILY MEDICINE

## 2025-06-03 RX ORDER — TRIAMCINOLONE ACETONIDE 1 MG/G
OINTMENT TOPICAL
COMMUNITY
Start: 2025-03-21 | End: 2025-06-03 | Stop reason: WASHOUT

## 2025-06-03 ASSESSMENT — PATIENT HEALTH QUESTIONNAIRE - PHQ9
SUM OF ALL RESPONSES TO PHQ9 QUESTIONS 1 AND 2: 0
1. LITTLE INTEREST OR PLEASURE IN DOING THINGS: NOT AT ALL
2. FEELING DOWN, DEPRESSED OR HOPELESS: NOT AT ALL

## 2025-06-03 ASSESSMENT — ACTIVITIES OF DAILY LIVING (ADL)
GROCERY_SHOPPING: INDEPENDENT
MANAGING_FINANCES: INDEPENDENT
DOING_HOUSEWORK: INDEPENDENT
TAKING_MEDICATION: INDEPENDENT
DRESSING: INDEPENDENT
BATHING: INDEPENDENT

## 2025-06-03 ASSESSMENT — ENCOUNTER SYMPTOMS
LOSS OF SENSATION IN FEET: 0
OCCASIONAL FEELINGS OF UNSTEADINESS: 0
DEPRESSION: 0

## 2025-06-03 NOTE — PROGRESS NOTES
Subjective   Reason for Visit: Kevin Pyle is an 69 y.o. male here for a Medicare Wellness visit.       Past Medical, Surgical, and Family History reviewed and updated in chart.    Reviewed all medications by prescribing practitioner or clinical pharmacist (such as prescriptions, OTCs, herbal therapies and supplements) and documented in the medical record.    HPI  History of Present Illness  The patient is a 69-year-old male who presents for a Medicare annual wellness visit.    He reports overall good health, with no complaints of leg pain. He has not experienced any falls within the past year and maintains a safe home environment. His last ophthalmological examination was conducted in 01/2025. He adheres to a healthy diet, incorporating fruits and vegetables, and engages in regular physical activity, approximating 100 to 120 minutes per week. He reports no gastrointestinal issues. He is planning a trip to [place] next week.    He consumes alcohol approximately twice a week, usually beer, and drinks two cups of coffee daily without any gastrointestinal discomfort. He does not smoke cigarettes.    He is currently on atorvastatin for cholesterol management, Prilosec for gastrointestinal health, and Viagra as needed. He has discontinued the use of Kenalog cream.    SOCIAL HISTORY  He does not smoke. He consumes alcohol maybe twice a week, usually beer. He drinks 2 cups of coffee a day.       Patient Care Team:  Chuck Leon DO as PCP - General       12 Systems have been reviewed as follows.  Constitutional: Fever, weight gain, weight loss, appetite change, night sweats, fatigue, chills.  Eyes : blurry, double vision, vision, loss, tearing, redness, pain, sensitivity to light, glaucoma.  Ears, nose, mouth, and throat: Hearing loss, ringing in the ears, ear pain, nasal congestion, nasal drainage, nosebleeds, mouth, throat, irritation tooth problem.  Cardiovascular :chest pain, pressure, heart racing,  "palpitations, sweating, leg swelling, high or low blood pressure  Pulmonary: Cough, yellow or green sputum, blood and sputum, shortness of breath, wheezing  Gastrointestinal: Nausea, vomiting, diarrhea, constipation, pain, blood in stool, or vomitus, heartburn, difficulty swallowing  Genitourinary: incontinence, abnormal bleeding, abnormal discharge, urinary frequency, urinary hesitancy, pain, impotence sexual problem, infection, urinary retention  Musculoskeletal: Pain, stiffness, joint, redness or warmth, arthritis, back pain, weakness, muscle wasting, sprain or fracture  Neuro: Weight weakness, dizziness, change in voice, change in taste change in vision, change in hearing, loss, or change of sensation, trouble walking, balance problems coordination problems, shaking, speech problem  Endocrine , cold or heat intolerance, blood sugar problem, weight gain or loss missed periods hot flashes, sweats, change in body hair, change in libido, increased thirst, increased urination  Heme/lymph: Swelling, bleeding, problem anemia, bruising, enlarged lymph nodes  Allergic/immunologic: H. plus nasal drip, watery itchy eyes, nasal drainage, immunosuppressed  The above were reviewed and noted negative except as noted in HPI and Problem List.    Objective   Vitals:  /80 (BP Location: Right arm, Patient Position: Sitting, BP Cuff Size: Adult)   Pulse 63   Temp 36.8 °C (98.2 °F) (Temporal)   Resp 18   Ht 1.753 m (5' 9\")   Wt 88.9 kg (196 lb)   SpO2 98%   BMI 28.94 kg/m²         Physical Exam  Neurological: Awake, alert, oriented x4, no focal deficit  Head: Normocephalic, atraumatic  Ears: External ear canals and tympanic membranes intact  Eyes: Pupils equal and round, conjunctivae clear  Nose: Septum midline, nares patent, mucosa normal  Mouth/Throat: Mucous membranes moist, no erythema, no exudate  Neck: Supple, no abnormalities  Respiratory: Clear to auscultation, no wheezing, rales or rhonchi  Cardiovascular: " Regular rate and rhythm, no murmurs, rubs, or gallops  Gastrointestinal: Soft, no tenderness, no distention, no masses  Extremities: No edema, no cyanosis  Musculoskeletal: No joint or muscular abnormalities noted  Skin: No abnormalities, no rashes or lesions     Constitutional: Well developed, well nourished, alert and in no acute distress   Eyes: Normal external exam. Pupils equally round and reactive to light with normal accommodation and extraocular movements intact.  Neck: Supple, no lymphadenopathy or masses.   Cardiovascular: Regular rate and rhythm, normal S1 and S2, no murmurs, gallops, or rubs. Radial pulses normal. No peripheral edema.  Pulmonary: No respiratory distress, lungs clear to auscultation bilaterally. No wheezes, rhonchi, rales.  Abdomen: soft,non tender, non distended, without masses or HSM  Skin: Warm, well perfused, normal skin turgor and color.   Neurologic: Cranial nerves II-XII grossly intact.   Psychiatric: Mood calm and affect normal  Musculoskeletal: Moving all extremities without restriction  The above were reviewed and noted negative except as noted in HPI and Problem List.    Problem List Items Addressed This Visit       Hyperlipidemia      Orders:    Comprehensive Metabolic Panel; Future    Lipid Panel; Future           Relevant Orders    Comprehensive Metabolic Panel    Lipid Panel    GERD (gastroesophageal reflux disease)                   Other Visit Diagnoses         Medicare annual wellness visit, subsequent    -  Primary      Vitamin D deficiency        Relevant Orders    Vitamin D 25-Hydroxy,Total (for eval of Vitamin D levels)      Thoracogenic scoliosis of thoracolumbar region                 Assessment & Plan  Medicare annual wellness visit, subsequent         Mixed hyperlipidemia    Orders:    Comprehensive Metabolic Panel; Future    Lipid Panel; Future    Vitamin D deficiency    Orders:    Vitamin D 25-Hydroxy,Total (for eval of Vitamin D levels);  Future    Gastroesophageal reflux disease with esophagitis without hemorrhage         Thoracogenic scoliosis of thoracolumbar region            Results  Labs   - Total cholesterol: 12/2024, 222 mg/dL   - HDL: 12/2024, 54 mg/dL   - LDL: 12/2024, Elevated   - Blood sugar: 12/2024, 98 mg/dL   - Potassium: 12/2024, 4.2 mmol/L   - Sodium: 12/2024, 142 mmol/L   - BUN: 12/2024, 18 mg/dL   - Creatinine: 12/2024, 0.92 mg/dL   - GFR: 12/2024, Greater than 60 or 90   - AST: 12/2024, 22 U/L   - ALT: 12/2024, 28 U/L   - A1c: 10/2024, 5.4%   - Vitamin D: 08/2023, 44 ng/mL       Assessment & Plan  1. Medicare annual wellness visit.  - Immunization status is up-to-date, including a tetanus shot in 01/2025.  - Advised to maintain a healthy diet and exercise regimen, aiming for at least 150 minutes of physical activity per week.  - Last eye exam was in 01/2025 with no changes reported.  - A fasting blood test will be conducted this week to monitor cholesterol levels and other parameters.    2. Hyperlipidemia.  - Currently on atorvastatin for cholesterol management.  - Total cholesterol was 222, HDL was 54, and LDL had climbed up.  - Repeat lipid panel will be done to assess current levels.  - Previous blood work in 12/2024 showed blood sugar 98, potassium 4.2, sodium 142, BUN 18, creatinine 0.92, GFR >60, AST 22, ALT 28.    3. Gastroesophageal Reflux Disease (GERD).  - Taking Prilosec for stomach issues.  - No current symptoms reported.  - Regular bowel movements and no gut issues with caffeine intake.    4. Erectile Dysfunction.  - Uses Viagra as needed.  - No issues reported with current usage.    5. Medication Management.  - Advised to discontinue the use of Kenalog cream as it is no longer needed.  - Vitamin D levels were good in 08/2023, but will check again if necessary.    Follow-up  The patient will follow up in approximately 4.5 months.         Advance Directives Discussion  16 - 20 minutes were spent discussing Advanced  Care Planning (including a Living Will, Medical Power Of , as well as specific end of life choices and/or directives). The details of that discussion were documented in Advanced Directives Discussion section of the medical record.

## 2025-06-03 NOTE — PATIENT INSTRUCTIONS
Follow up in 4 months    Continue current medications and therapy for chronic medical conditions.    Patient was advised importance of proper diet/nutrition in addition adequate hydration. Patient was encouraged moderate exercise program to include 30 minutes daily for 5 days of the week or 150 minutes weekly. Patient will follow-up with us as scheduled.

## 2025-08-08 DIAGNOSIS — K21.9 GASTROESOPHAGEAL REFLUX DISEASE, UNSPECIFIED WHETHER ESOPHAGITIS PRESENT: ICD-10-CM

## 2025-08-08 RX ORDER — OMEPRAZOLE 40 MG/1
40 CAPSULE, DELAYED RELEASE ORAL DAILY
Qty: 90 CAPSULE | Refills: 1 | Status: SHIPPED | OUTPATIENT
Start: 2025-08-08

## 2025-08-08 NOTE — TELEPHONE ENCOUNTER
Recent Visits  Date Type Provider Dept   06/03/25 Office Visit Chuck Leon, DO [facility]   Showing recent visits within past 90 days and meeting all other requirements  Future Appointments  Date Type Provider Dept   11/03/25 Appointment Chuck Leon, DO [facility]   Showing future appointments within next 90 days and meeting all other requirements

## 2025-08-13 LAB
25(OH)D3+25(OH)D2 SERPL-MCNC: 44 NG/ML (ref 30–100)
ALBUMIN SERPL-MCNC: 4.4 G/DL (ref 3.6–5.1)
ALP SERPL-CCNC: 67 U/L (ref 35–144)
ALT SERPL-CCNC: 18 U/L (ref 9–46)
ANION GAP SERPL CALCULATED.4IONS-SCNC: 5 MMOL/L (CALC) (ref 7–17)
AST SERPL-CCNC: 17 U/L (ref 10–35)
BILIRUB SERPL-MCNC: 0.5 MG/DL (ref 0.2–1.2)
BUN SERPL-MCNC: 21 MG/DL (ref 7–25)
CALCIUM SERPL-MCNC: 9.6 MG/DL (ref 8.6–10.3)
CHLORIDE SERPL-SCNC: 106 MMOL/L (ref 98–110)
CHOLEST SERPL-MCNC: 199 MG/DL
CHOLEST/HDLC SERPL: 3 (CALC)
CO2 SERPL-SCNC: 29 MMOL/L (ref 20–32)
CREAT SERPL-MCNC: 0.94 MG/DL (ref 0.7–1.35)
EGFRCR SERPLBLD CKD-EPI 2021: 88 ML/MIN/1.73M2
GLUCOSE SERPL-MCNC: 102 MG/DL (ref 65–99)
HDLC SERPL-MCNC: 67 MG/DL
LDLC SERPL CALC-MCNC: 116 MG/DL (CALC)
NONHDLC SERPL-MCNC: 132 MG/DL (CALC)
POTASSIUM SERPL-SCNC: 4.6 MMOL/L (ref 3.5–5.3)
PROT SERPL-MCNC: 7.1 G/DL (ref 6.1–8.1)
SODIUM SERPL-SCNC: 140 MMOL/L (ref 135–146)
TRIGL SERPL-MCNC: 67 MG/DL

## 2025-09-04 ENCOUNTER — TELEMEDICINE (OUTPATIENT)
Dept: PRIMARY CARE | Facility: CLINIC | Age: 70
End: 2025-09-04
Payer: COMMERCIAL

## 2025-09-04 DIAGNOSIS — U07.1 COVID: Primary | ICD-10-CM

## 2025-09-04 PROCEDURE — 1160F RVW MEDS BY RX/DR IN RCRD: CPT | Performed by: FAMILY MEDICINE

## 2025-09-04 PROCEDURE — 99214 OFFICE O/P EST MOD 30 MIN: CPT | Performed by: FAMILY MEDICINE

## 2025-09-04 PROCEDURE — 1159F MED LIST DOCD IN RCRD: CPT | Performed by: FAMILY MEDICINE

## 2025-11-03 ENCOUNTER — APPOINTMENT (OUTPATIENT)
Dept: PRIMARY CARE | Facility: CLINIC | Age: 70
End: 2025-11-03
Payer: COMMERCIAL